# Patient Record
Sex: MALE | HISPANIC OR LATINO | Employment: FULL TIME | URBAN - METROPOLITAN AREA
[De-identification: names, ages, dates, MRNs, and addresses within clinical notes are randomized per-mention and may not be internally consistent; named-entity substitution may affect disease eponyms.]

---

## 2017-09-06 ENCOUNTER — HOSPITAL ENCOUNTER (OUTPATIENT)
Facility: MEDICAL CENTER | Age: 47
End: 2017-09-06
Attending: SURGERY | Admitting: SURGERY
Payer: COMMERCIAL

## 2017-09-06 VITALS
RESPIRATION RATE: 17 BRPM | TEMPERATURE: 97.6 F | BODY MASS INDEX: 27.34 KG/M2 | SYSTOLIC BLOOD PRESSURE: 102 MMHG | DIASTOLIC BLOOD PRESSURE: 66 MMHG | HEART RATE: 72 BPM | WEIGHT: 154.32 LBS | HEIGHT: 63 IN | OXYGEN SATURATION: 97 %

## 2017-09-06 PROBLEM — R22.9 LOCALIZED SUPERFICIAL SWELLING, MASS, OR LUMP: Status: ACTIVE | Noted: 2017-09-06

## 2017-09-06 PROCEDURE — 88304 TISSUE EXAM BY PATHOLOGIST: CPT

## 2017-09-06 PROCEDURE — 700101 HCHG RX REV CODE 250

## 2017-09-06 PROCEDURE — 160039 HCHG SURGERY MINUTES - EA ADDL 1 MIN LEVEL 3: Performed by: SURGERY

## 2017-09-06 PROCEDURE — A9270 NON-COVERED ITEM OR SERVICE: HCPCS

## 2017-09-06 PROCEDURE — 160009 HCHG ANES TIME/MIN: Performed by: SURGERY

## 2017-09-06 PROCEDURE — 700111 HCHG RX REV CODE 636 W/ 250 OVERRIDE (IP)

## 2017-09-06 PROCEDURE — 160035 HCHG PACU - 1ST 60 MINS PHASE I: Performed by: SURGERY

## 2017-09-06 PROCEDURE — 160036 HCHG PACU - EA ADDL 30 MINS PHASE I: Performed by: SURGERY

## 2017-09-06 PROCEDURE — 160048 HCHG OR STATISTICAL LEVEL 1-5: Performed by: SURGERY

## 2017-09-06 PROCEDURE — 160002 HCHG RECOVERY MINUTES (STAT): Performed by: SURGERY

## 2017-09-06 PROCEDURE — 700102 HCHG RX REV CODE 250 W/ 637 OVERRIDE(OP)

## 2017-09-06 PROCEDURE — 160028 HCHG SURGERY MINUTES - 1ST 30 MINS LEVEL 3: Performed by: SURGERY

## 2017-09-06 PROCEDURE — 501838 HCHG SUTURE GENERAL: Performed by: SURGERY

## 2017-09-06 RX ORDER — LIDOCAINE HYDROCHLORIDE 10 MG/ML
INJECTION, SOLUTION INFILTRATION; PERINEURAL
Status: COMPLETED
Start: 2017-09-06 | End: 2017-09-06

## 2017-09-06 RX ORDER — LIDOCAINE AND PRILOCAINE 25; 25 MG/G; MG/G
1 CREAM TOPICAL
Status: COMPLETED | OUTPATIENT
Start: 2017-09-06 | End: 2017-09-06

## 2017-09-06 RX ORDER — HYDROCODONE BITARTRATE AND ACETAMINOPHEN 5; 325 MG/1; MG/1
1-2 TABLET ORAL EVERY 6 HOURS PRN
Qty: 40 TAB | Refills: 0 | Status: SHIPPED | OUTPATIENT
Start: 2017-09-06 | End: 2018-09-24

## 2017-09-06 RX ORDER — SODIUM CHLORIDE, SODIUM LACTATE, POTASSIUM CHLORIDE, CALCIUM CHLORIDE 600; 310; 30; 20 MG/100ML; MG/100ML; MG/100ML; MG/100ML
INJECTION, SOLUTION INTRAVENOUS CONTINUOUS
Status: DISCONTINUED | OUTPATIENT
Start: 2017-09-06 | End: 2017-09-06 | Stop reason: HOSPADM

## 2017-09-06 RX ORDER — LIDOCAINE HYDROCHLORIDE 10 MG/ML
0.5 INJECTION, SOLUTION INFILTRATION; PERINEURAL
Status: COMPLETED | OUTPATIENT
Start: 2017-09-06 | End: 2017-09-06

## 2017-09-06 RX ORDER — OXYCODONE HCL 5 MG/5 ML
SOLUTION, ORAL ORAL
Status: COMPLETED
Start: 2017-09-06 | End: 2017-09-06

## 2017-09-06 RX ORDER — BUPIVACAINE HYDROCHLORIDE AND EPINEPHRINE 5; 5 MG/ML; UG/ML
INJECTION, SOLUTION PERINEURAL
Status: DISCONTINUED | OUTPATIENT
Start: 2017-09-06 | End: 2017-09-06 | Stop reason: HOSPADM

## 2017-09-06 RX ADMIN — LIDOCAINE HYDROCHLORIDE 0.5 ML: 10 INJECTION, SOLUTION INFILTRATION; PERINEURAL at 08:20

## 2017-09-06 RX ADMIN — OXYCODONE HYDROCHLORIDE 10 MG: 5 SOLUTION ORAL at 11:35

## 2017-09-06 RX ADMIN — SODIUM CHLORIDE, SODIUM LACTATE, POTASSIUM CHLORIDE, CALCIUM CHLORIDE 1000 ML: 600; 310; 30; 20 INJECTION, SOLUTION INTRAVENOUS at 08:20

## 2017-09-06 ASSESSMENT — PAIN SCALES - GENERAL
PAINLEVEL_OUTOF10: 2
PAINLEVEL_OUTOF10: 0
PAINLEVEL_OUTOF10: 0
PAINLEVEL_OUTOF10: 2
PAINLEVEL_OUTOF10: 0
PAINLEVEL_OUTOF10: 2
PAINLEVEL_OUTOF10: 0

## 2017-09-06 NOTE — PROGRESS NOTES
D/C instructions:    1. DIET: Upon discharge from the hospital you may resume your normal preoperative diet. Depending on how you are feeling and whether you have nausea or not, you may wish to stay with a bland diet for the first few days. However, you can advance this as quickly as you feel ready.    2. ACTIVITIES: After discharge from the hospital, you may resume full routine activities. However, there should be no heavy lifting (greater than 15 pounds) and no strenuous activities until after your follow-up visit. Otherwise, routine activities of daily living are acceptable.    3. DRIVING: You may drive whenever you are off pain medications and are able to perform the activities needed to drive, i.e. turning, bending, twisting, etc.    4. BATHING: You may get the wound wet at any time after leaving the hospital. You may shower, but do not submerge in a bath for at least a week. Dressings may come off after 48 hours.    5. BOWEL FUNCTION: Constipation is common after an operation, especially with pain medications. The combination of pain medication and decreased activity level can cause constipation in otherwise normal patients. If you feel this is occurring, take a laxative (Milk of Magnesia, Ex-Lax, Senokot, etc.) until the problem has resolved.    6. PAIN MEDICATION: You will be given a prescription for pain medication at discharge. Please take these as directed. It is important to remember not to take medications on an empty stomach as this may cause nausea.    7.CALL IF YOU HAVE: (1) Fevers to more than 1010 F, (2) Unusual chest or leg pain, (3) Drainage or fluid from incision that may be foul smelling, increased tenderness or soreness at the wound or the wound edges are no longer together, redness or swelling at the incision site. Please do not hesitate to call with any other questions.     8. APPOINTMENT: Contact our office at [phone] for a follow-up appointment in 1 weeks following your  procedure.    If you have any additional questions, please do not hesitate to call the office and speak to either myself or the physician on call.    Office address:  [address] [place], [place] [address] [address]      Lyle Lucia MD  [facility]  [phone]

## 2017-09-06 NOTE — OP REPORT
DATE OF SERVICE:  09/06/2017    PREOPERATIVE DIAGNOSIS:  Intermuscular right upper back mass.    POSTOPERATIVE DIAGNOSIS:  Intermuscular right upper back mass.    PROCEDURE:  Excision of deep intramuscular mass of the right upper back.    SURGEON:  Lyle Lucia MD    ASSISTANT:  HOSEA Eckert    ANESTHESIOLOGIST:  Jose Almanzar MD    ANESTHESIA:  General endotracheal.    ESTIMATED BLOOD LOSS:  20 mL    SPECIMENS:  Right upper back mass.    INDICATIONS:  This patient is a very pleasant 47-year-old male who presented   with the groin mass in his right upper back.  Preoperative imaging suggestive   that this was intramuscular.  It was likely consistent with a large lipoma   versus possible liposarcoma.  He desired excision of this and I agreed.  We   discussed risks including bleeding, infection, recurrence, need for further   operation.  He understood these risks and is willing to proceed.    FINDINGS:  There was a fatty mass within the musculature superficial to the   right scapula measuring approximately 10 x 8 x 6 cm.    PROCEDURE IN DETAIL:  Informed consent was obtained.  The patient was brought   back to the operating room and general anesthesia was induced.  He was then   repositioned in a prone placement.  Pre-incision time-out procedure was   performed and pre-incision antibiotics were given.  After the area was   thoroughly prepped and draped, the  skin incision was made over the area.  We   dissected down through the subcutaneous tissues and in the deep fascia over   the muscle.  Muscle fibers were divided to encounter the mass.  The mass shelled out   easily from the surrounding structures was excised using cautery.  The   specimen was oriented and passed off.  The area was thoroughly irrigated and   hemostasis was achieved using electrocautery.  We then reapproximated the   fascia over the muscle using interrupted 3-0 Vicryl sutures.  The deep dermal   layer was then closed with  interrupted 3-0 Vicryl sutures and the skin wounds   were closed with running subcuticular 4-0 Vicryl.  The wound was then dressed   with Steri-Strips, gauze and Tegaderm.  Patient tolerated the procedure well   and was taken to recovery in stable condition.  All sponge and instrument   counts were correct.       ____________________________________     MD LEYLA Sher / ANH    DD:  09/06/2017 10:59:24  DT:  09/06/2017 11:27:38    D#:  [id number]  Job#:  [id number]

## 2017-09-06 NOTE — DISCHARGE INSTRUCTIONS
ACTIVITY: Rest and take it easy for the first 24 hours.  A responsible adult is recommended to remain with you during that time.  It is normal to feel sleepy.  We encourage you to not do anything that requires balance, judgment or coordination.    MILD FLU-LIKE SYMPTOMS ARE NORMAL. YOU MAY EXPERIENCE GENERALIZED MUSCLE ACHES, THROAT IRRITATION, HEADACHE AND/OR SOME NAUSEA.    FOR 24 HOURS DO NOT:  Drive, operate machinery or run household appliances.  Drink beer or alcoholic beverages.   Make important decisions or sign legal documents.    SPECIAL INSTRUCTIONS: *PLEASE SEE ATTACHED INSTRUCTION SHEET**    DIET: To avoid nausea, slowly advance diet as tolerated, avoiding spicy or greasy foods for the first day.  Add more substantial food to your diet according to your physician's instructions.  Babies can be fed formula or breast milk as soon as they are hungry.  INCREASE FLUIDS AND FIBER TO AVOID CONSTIPATION.    SURGICAL DRESSING/BATHING: *OK TO SHOWER.  MAY REMOVED DRESSING IN 48 HOURS.  NO TUB BATHS, HOT TUBS OR SOAKING.**    FOLLOW-UP APPOINTMENT:  A follow-up appointment should be arranged with your doctor; call to schedule.    You should CALL YOUR PHYSICIAN if you develop:  Fever greater than 101 degrees F.  Pain not relieved by medication, or persistent nausea or vomiting.  Excessive bleeding (blood soaking through dressing) or unexpected drainage from the wound.  Extreme redness or swelling around the incision site, drainage of pus or foul smelling drainage.  Inability to urinate or empty your bladder within 8 hours.  Problems with breathing or chest pain.    You should call 911 if you develop problems with breathing or chest pain.  If you are unable to contact your doctor or surgical center, you should go to the nearest emergency room or urgent care center.    Physician's telephone #: *[phone]**    If any questions arise, call your doctor.  If your doctor is not available, please feel free to call the  Surgical Center at [phone].  The Center is open Monday through Friday from 7AM to 7PM.  You can also call the HEALTH HOTLINE open 24 hours/day, 7 days/week and speak to a nurse at [phone], or toll free at [phone].    A registered nurse may call you a few days after your surgery to see how you are doing after your procedure.    MEDICATIONS: Resume taking daily medication.  Take prescribed pain medication with food.  If no medication is prescribed, you may take non-aspirin pain medication if needed.  PAIN MEDICATION CAN BE VERY CONSTIPATING.  Take a stool softener or laxative such as senokot, pericolace, or milk of magnesia if needed.    Prescription given for *NORCO**.  Last pain medication given at **11:30*.    If your physician has prescribed pain medication that includes Acetaminophen (Tylenol), do not take additional Acetaminophen (Tylenol) while taking the prescribed medication.    Depression / Suicide Risk    As you are discharged from this [facility] facility, it is important to learn how to keep safe from harming yourself.    Recognize the warning signs:  · Abrupt changes in personality, positive or negative- including increase in energy   · Giving away possessions  · Change in eating patterns- significant weight changes-  positive or negative  · Change in sleeping patterns- unable to sleep or sleeping all the time   · Unwillingness or inability to communicate  · Depression  · Unusual sadness, discouragement and loneliness  · Talk of wanting to die  · Neglect of personal appearance   · Rebelliousness- reckless behavior  · Withdrawal from people/activities they love  · Confusion- inability to concentrate     If you or a loved one observes any of these behaviors or has concerns about self-harm, here's what you can do:  · Talk about it- your feelings and reasons for harming yourself  · Remove any means that you might use to hurt yourself (examples: pills, rope, extension cords, firearm)  · Get  professional help from the community (Mental Health, Substance Abuse, psychological counseling)  · Do not be alone:Call your Safe Contact- someone whom you trust who will be there for you.  · Call your local CRISIS HOTLINE 457-7760 or 448-802-7320  · Call your local Children's Mobile Crisis Response Team [place] (471) 447-3923 or www.[employer]  · Call the toll free National Suicide Prevention Hotlines   · National Suicide Prevention Lifeline 411-162-MERQ (0936)  · National Hope Line Network 800-SUICIDE (537-1618)

## 2017-09-06 NOTE — OR NURSING
RECEIVED FROM OR WITH DR KOWALSKI.  ORAL AIRWAY IN PLACE.  VSS.    1117 AIRWAY DC'D WITHOUT PROBLEM.  SLEEPY.  GAUZE AND TEGADERM DRESSING ON RIGHT MID BACK DRY AND IN TACT.  1135 STATES HIS BACK HURTS A LITTLE.  GIVEN ORAL PAIN MEDICATION PER ORDER. POSITIONED ON LEFT SIDE FOR COMFORT.  1150  WIFE BROUGHT TO BEDSIDE.    1230 PAIN RESOLVED.  EATING POPSICLES  1305 UP AND DRESSED AND IN RECLINER.  DISCHARGE INSTRUCTIONS TO PATIENT AND WIFE.    1350  PATIENT FEELS READY FOR DISCHARGE.  NO CHANGE AT SITE. ALL QUESTIONS ANSWERED.

## 2017-09-06 NOTE — OR SURGEON
Operative Report    PreOp Diagnosis: back mass    PostOp Diagnosis: same    Procedure(s):  MASS EXCISION GENERAL - SUBCUTANEOUS, SCAPULA - Wound Class: Clean    Surgeon(s):  Lyle Lucia M.D.    Anesthesiologist/Type of Anesthesia:  Anesthesiologist: Jose Almanzar M.D./General    Surgical Staff:  Assistant: Lesvia Prabhakar  Circulator: Shahnaz Ortega R.N.  Scrub Person: Taya Virgen R.N.    Specimens:  * No specimens in log *    Estimated Blood Loss: 20 cc    Findings: intramuscular lipomatous mass over right scapula    Complications: none        9/6/2017 10:55 AM Lyle Lucia

## 2018-07-30 ENCOUNTER — OCCUPATIONAL MEDICINE (OUTPATIENT)
Dept: URGENT CARE | Facility: CLINIC | Age: 48
End: 2018-07-30
Payer: COMMERCIAL

## 2018-07-30 ENCOUNTER — APPOINTMENT (OUTPATIENT)
Dept: RADIOLOGY | Facility: IMAGING CENTER | Age: 48
End: 2018-07-30
Attending: PHYSICIAN ASSISTANT
Payer: COMMERCIAL

## 2018-07-30 VITALS
SYSTOLIC BLOOD PRESSURE: 112 MMHG | RESPIRATION RATE: 16 BRPM | OXYGEN SATURATION: 100 % | HEIGHT: 63 IN | TEMPERATURE: 97.5 F | HEART RATE: 56 BPM | WEIGHT: 154 LBS | BODY MASS INDEX: 27.29 KG/M2 | DIASTOLIC BLOOD PRESSURE: 76 MMHG

## 2018-07-30 DIAGNOSIS — M62.838 MUSCLE SPASM: ICD-10-CM

## 2018-07-30 DIAGNOSIS — S39.012A STRAIN OF LUMBAR REGION, INITIAL ENCOUNTER: ICD-10-CM

## 2018-07-30 DIAGNOSIS — M54.5 ACUTE MIDLINE LOW BACK PAIN, WITH SCIATICA PRESENCE UNSPECIFIED: ICD-10-CM

## 2018-07-30 LAB
AMP AMPHETAMINE: NORMAL
BAR BARBITURATES: NORMAL
BREATH ALCOHOL COMMENT: NORMAL
BZO BENZODIAZEPINES: NORMAL
COC COCAINE: NORMAL
INT CON NEG: NORMAL
INT CON POS: NORMAL
MDMA ECSTASY: NORMAL
MET METHAMPHETAMINES: NORMAL
MTD METHADONE: NORMAL
OPI OPIATES: NORMAL
OXY OXYCODONE: NORMAL
PCP PHENCYCLIDINE: NORMAL
POC BREATHALIZER: 0 PERCENT (ref 0–0.01)
POC URINE DRUG SCREEN OCDRS: NEGATIVE
THC: NORMAL

## 2018-07-30 PROCEDURE — 80305 DRUG TEST PRSMV DIR OPT OBS: CPT | Performed by: PHYSICIAN ASSISTANT

## 2018-07-30 PROCEDURE — 82075 ASSAY OF BREATH ETHANOL: CPT | Performed by: PHYSICIAN ASSISTANT

## 2018-07-30 PROCEDURE — 99203 OFFICE O/P NEW LOW 30 MIN: CPT | Mod: 29 | Performed by: PHYSICIAN ASSISTANT

## 2018-07-30 PROCEDURE — 72100 X-RAY EXAM L-S SPINE 2/3 VWS: CPT | Mod: TC,29 | Performed by: PHYSICIAN ASSISTANT

## 2018-07-30 RX ORDER — CYCLOBENZAPRINE HCL 5 MG
5-10 TABLET ORAL 3 TIMES DAILY PRN
Qty: 30 TAB | Refills: 0 | Status: SHIPPED | OUTPATIENT
Start: 2018-07-30 | End: 2018-08-23

## 2018-07-30 NOTE — LETTER
"EMPLOYEE’S CLAIM FOR COMPENSATION/ REPORT OF INITIAL TREATMENT  FORM C-4    EMPLOYEE’S CLAIM - PROVIDE ALL INFORMATION REQUESTED   First Name  John Last Name  Josh Moran Birthdate                    1970                Sex  male Claim Number   Home Address  [address] Age  48 y.o. Height  1.6 m (5' 3\") Weight  69.9 kg (154 lb) [facility]     [facility] [place]  [address] Telephone  [phone] (home)    Mailing Address  [address] [facility]  [address] Primary Language Spoken  English    Insurer  Unknown Third Party       Employee's Occupation (Job Title) When Injury or Occupational Disease Occurred      Employer's Name  [employer]  Telephone  [phone]    Employer Address  [address] [facility]  [address]    Date of Injury  7/26/2018               Hour of Injury  11:22 AM Date Employer Notified  7/26/2018 Last Day of Work after Injury or Occupational Disease  7/26/2018 Supervisor to Whom Injury Reported  Ayse   Address or Location of Accident (if applicable)  [[address] [facility] [address]]   What were you doing at the time of accident? (if applicable)  Load /unload of suitcases    How did this injury or occupational disease occur? (Be specific an answer in detail. Use additional sheet if necessary)  Had lifted a 40lbs suitcase and was going to load it onto the shuttle when I felt a sharp pain in my lower back hip and leg   If you believe that you have an occupational disease, when did you first have knowledge of the disability and it relationship to your employment?  n/a Witnesses to the Accident  no      Nature of Injury or Occupational Disease  Strain  Part(s) of Body Injured or Affected  Lower Back Area (Lumbar Area & Lumbo-Sacral), Hip (L), Lower Leg (L)    I certify that the above is true and correct to the best of my knowledge and that I have " provided this information in order to obtain the benefits of [place]  I hereby authorize any physician, chiropractor, surgeon, practitioner, or other person, any hospital, including [facility] hospital, any medical service organization, any insurance company, or other institution or organization to release to each other, any medical or other information, including benefits paid or payable, pertinent to this injury or disease, except information relative to diagnosis, treatment and/or counseling for AIDS, psychological conditions, alcohol or controlled substances, for which I must give specific authorization.  A Photostat of this authorization shall be as valid as the original.     Date   Place   Employee’s Signature   THIS REPORT MUST BE COMPLETED AND MAILED WITHIN 3 WORKING DAYS OF TREATMENT   Place  [facility]  Name of Facility  [facility]   Date  7/30/2018 Diagnosis  (S39.012A) Strain of lumbar region, initial encounter  (M62.838) Muscle spasm Is there evidence the injured employee was under the influence of alcohol and/or another controlled substance at the time of accident?   Hour  4:37 PM Description of Injury or Disease  Diagnoses of Strain of lumbar region, initial encounter and Muscle spasm were pertinent to this visit.     Treatment  Patient is to utilize ibuprofen 600 mg 3 times daily as needed with food, Flexeril was also written for this patient today as well.  Patient is to continue to use heat rotation.  Recheck in 3 days.  Have you advised the patient to remain off work five days or more? No   X-Ray Findings  Negative  Comments:Negative lumbar x-ray   If Yes   From Date  To Date      From information given by the employee, together with medical evidence, can you directly connect this injury or occupational disease as job incurred?    Comments:?  Difficult to  "correlate with exact mechanism, injury or fall. If No Full Duty  No Modified Duty  Yes   Is additional medical care by a physician indicated?  Yes If Modified Duty, Specify any Limitations / Restrictions      Do you know of any previous injury or disease contributing to this condition or occupational disease?                            No   Date  7/30/2018 Print Doctor’s Name Lorenzo Lemos P.A.-C. I certify the employer’s copy of  this form was mailed on:   Address  [address] [place] [address] [address]’s Use Only     [facility]  [address]    Provider’s Tax ID Number  [id number] Telephone  Dept: [phone]        e-LORENZO Villagomez P.A.-C.   e-Signature: Dr. Carson David, Medical Director Degree  MARLEEN        ORIGINAL-TREATING PHYSICIAN OR CHIROPRACTOR    PAGE 2-INSURER/TPA    PAGE 3-EMPLOYER    PAGE 4-EMPLOYEE             Form C-4 (rev10/07)              BRIEF DESCRIPTION OF RIGHTS AND BENEFITS  (Pursuant to [place]RS 616C.050)    Notice of Injury or Occupational Disease (Incident Report Form C-1): If an injury or occupational disease (OD) arises out of and in the  course of employment, you must provide written notice to your employer as soon as practicable, but no later than 7 days after the accident or  OD. Your employer shall maintain a sufficient supply of the required forms.    Claim for Compensation (Form C-4): If medical treatment is sought, the form C-4 is available at the place of initial treatment. A completed  \"Claim for Compensation\" (Form C-4) must be filed within 90 days after an accident or OD. The treating physician or chiropractor must,  within 3 working days after treatment, complete and mail to the employer, the employer's insurer and third-party , the Claim for  Compensation.    Medical Treatment: If you require medical treatment for your on-the-job injury or OD, you may be required to select a physician or  chiropractor from a list provided by your workers’ " compensation insurer, if it has contracted with an Organization for Managed Care (MCO) or  Preferred Provider Organization (PPO) or providers of health care. If your employer has not entered into a contract with an MCO or PPO, you  may select a physician or chiropractor from the Panel of Physicians and Chiropractors. Any medical costs related to your industrial injury or  OD will be paid by your insurer.    Temporary Total Disability (TTD): If your doctor has certified that you are unable to work for a period of at least 5 consecutive days, or 5  cumulative days in a 20-day period, or places restrictions on you that your employer does not accommodate, you may be entitled to TTD  compensation.    Temporary Partial Disability (TPD): If the wage you receive upon reemployment is less than the compensation for TTD to which you are  entitled, the insurer may be required to pay you TPD compensation to make up the difference. TPD can only be paid for a maximum of 24  months.    Permanent Partial Disability (PPD): When your medical condition is stable and there is an indication of a PPD as a result of your injury or  OD, within 30 days, your insurer must arrange for an evaluation by a rating physician or chiropractor to determine the degree of your PPD. The  amount of your PPD award depends on the date of injury, the results of the PPD evaluation and your age and wage.    Permanent Total Disability (PTD): If you are medically certified by a treating physician or chiropractor as permanently and totally disabled  and have been granted a PTD status by your insurer, you are entitled to receive monthly benefits not to exceed 66 2/3% of your average  monthly wage. The amount of your PTD payments is subject to reduction if you previously received a PPD award.    Vocational Rehabilitation Services: You may be eligible for vocational rehabilitation services if you are unable to return to the job due to a  permanent physical  impairment or permanent restrictions as a result of your injury or occupational disease.    Transportation and Per Johnny Reimbursement: You may be eligible for travel expenses and per johnny associated with medical treatment.    Reopening: You may be able to reopen your claim if your condition worsens after claim closure.    Appeal Process: If you disagree with a written determination issued by the insurer or the insurer does not respond to your request, you may  appeal to the Department of Administration, , by following the instructions contained in your determination letter. You must  appeal the determination within 70 days from the date of the determination letter at [address], [address], [place], [place]  [address], or [address], [address], [place] [address]. If you disagree with the  decision, you may appeal to the  Department of Administration, . You must file your appeal within 30 days from the date of the  decision  letter at [address], [address], [place], [place] [address], or [address], [address], [place] [address]. If you  disagree with a decision of an , you may file a petition for judicial review with the District Court. You must do so within 30  days of the Appeal Officer’s decision. You may be represented by an  at your own expense or you may contact the [facility] for possible  representation.    [place]  for Injured Workers (NAIW): If you disagree with a  decision, you may request that NAIW represent you  without charge at an  Hearing. For information regarding denial of benefits, you may contact the [facility] at: [address], [address], [place] [address], (501) [phone], or [address], [address], [place] [address], ([phone]) [phone]    To File a Complaint with the Division: If you wish to file a complaint with  the  of the Division of Industrial Relations (DIR),  please contact the Workers’ Compensation Section, [address], [address], [place], [place] [address], telephone (801) 326-9657, or  [address], [address], [place], [place] [address], telephone [phone]) [phone].    For assistance with Workers’ Compensation Issues: you may contact the Office of the Governor Consumer Health Assistance, [address] [place] [address], [address], [place], [place] [address], Toll Free 1-441.929.4344, Web site: http://[email or web address].[facility].[email or web address]., E-mail  [email or web address].                                                                                                                                                                                                                                   __________________________________________________________________                                                                   _________________                Employee Name / Signature                                                                                                                                                       Date                                                                                                                                                                                                     D-2 (rev. 10/07)

## 2018-07-30 NOTE — LETTER
[place] [address] [facility] [address] [place] [address]  Phone:  [phone] - Fax:  [phone]   [facility] Progress Report and Disability Certification  Date of Service: 7/30/2018   No Show:  No  Date / Time of Next Visit: 8/2/2018@11:45am   Claim Information   Patient Name: John Moran  Claim Number:     Employer: [employer]  Date of Injury: 7/26/2018     Insurer / TPA:   [place] ID / SSN:     Occupation:   Diagnosis: Diagnoses of Strain of lumbar region, initial encounter and Muscle spasm were pertinent to this visit.    Medical Information   Related to Industrial Injury?   Comments:? difficult to correlate to specific injury .    Subjective Complaints:  DOI: 7/26/18-patient reports developing a low back pain with radiation to the front of his left leg toward the end of his shift on the 26th.  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  He reports that he can not time the back pain to a specific fall, injury or slippage of a luggage back and reports worsening pain toward the end of his shift.  Patient reports that since then he has been using aspirin, IcyHot and ice and heat without improvement of his symptoms.  Patient does report that his pain is 8 out of 10 at this time.  Patient denies prior history of back pain and or sciatica. Denies any leg weakness or paresthesias, incontinence, saddle anesthesias.   Patient denies a second job.   Objective Findings: Spine- without step-off or deformity. Without noted tenderness over the sacroiliac notches. Midline lumbar tenderness over L4-5-  without scoliosis or kyphosis. Left lumbar paraspinous spasm- FROM with lateral bend, and flexion/extension. Sensation intact bilaterally, BLE motor 5/5 and symmetrical  strength. Negative Straight leg raise.  Gait- WNL slow.       Pre-Existing Condition(s): None  known.    Assessment:   Initial Visit    Status: Additional Care Required  Permanent Disability:No    Plan:      Diagnostics: X-ray  Comments:Negative lumbar x-ray    Comments:       Disability Information   Status: Released to Restricted Duty    From:  2018  Through: 2018 Restrictions are: Temporary   Physical Restrictions   Sitting:    Standing:    Stooping:    Bending:      Squatting:    Walking:    Climbin hrs/day Pushing:      Pulling:    Other:    Reaching Above Shoulder (L):   Reaching Above Shoulder (R):       Reaching Below Shoulder (L):    Reaching Below Shoulder (R):      Not to exceed Weight Limits   Carrying(hrs):   Weight Limit(lb): < or = to 10 pounds Lifting(hrs):   Weight  Limit(lb): < or = to 10 pounds   Comments: Patient is to utilize ibuprofen 600 mg up to 3 times daily with meals.  Flexeril was also written today 5-10 mg up to 3 times daily as needed.  Patient is to return to clinic in 3 days for recheck or sooner if pain worsens.  Patient also is to continue with heat rotation    Repetitive Actions   Hands: i.e. Fine Manipulations from Grasping:     Feet: i.e. Operating Foot Controls:     Driving / Operate Machinery:     Physician Name: Lorenzo Lemos P.A.-C. Physician Signature: LORENZO Mcgregor P.A.-C. e-Signature: Dr. Carson David, Medical Director   Clinic Name / Location: [address] [address] Clinic Phone Number: Dept: [phone]   Appointment Time: 3:15 Pm Visit Start Time: 4:37 PM   Check-In Time:  3:24 Pm Visit Discharge Time: 5:52 Pm    Original-Treating Physician or Chiropractor    Page 2-Insurer/TPA    Page 3-Employer    Page 4-Employee

## 2018-07-31 ASSESSMENT — ENCOUNTER SYMPTOMS
WHEEZING: 0
FEVER: 0
VISUAL CHANGE: 0
EYE REDNESS: 0
NAUSEA: 0
VOMITING: 0
COUGH: 0
ABDOMINAL PAIN: 0
DIARRHEA: 0
SORE THROAT: 0
NECK PAIN: 0
EYE DISCHARGE: 0
FALLS: 0
BACK PAIN: 1
SENSORY CHANGE: 0
CHILLS: 0
JOINT SWELLING: 0
TINGLING: 0
HEADACHES: 0

## 2018-07-31 NOTE — PROGRESS NOTES
Subjective:      John Moran is a 48 y.o. male who presents with Other (unloading luggage pain from back to kne, causing hard to move )      DOI: 7/26/18-patient reports developing a low back pain with radiation to the front of his left leg toward the end of his shift on the 26th.  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  He reports that he can not time the back pain to a specific fall, injury or slippage of a luggage back and reports worsening pain toward the end of his shift.  Patient reports that since then he has been using aspirin, IcyHot and ice and heat without improvement of his symptoms.  Patient does report that his pain is 8 out of 10 at this time.  Patient denies prior history of back pain and or sciatica. Denies any leg weakness or paresthesias, incontinence, saddle anesthesias.   Patient denies a second job.     Other   This is a new problem. The current episode started in the past 7 days. The problem occurs constantly. The problem has been waxing and waning. Pertinent negatives include no abdominal pain, chills, congestion, coughing, fever, headaches, joint swelling, nausea, neck pain, rash, sore throat, visual change or vomiting. Exacerbated by: Bending. Treatments tried: ASA, ice, and heat.       Review of Systems   Constitutional: Negative for chills, fever and malaise/fatigue.   HENT: Negative for congestion, ear discharge, ear pain and sore throat.    Eyes: Negative for discharge and redness.   Respiratory: Negative for cough and wheezing.    Gastrointestinal: Negative for abdominal pain, diarrhea, nausea and vomiting.   Genitourinary: Negative for dysuria and urgency.        Denies any new urinary or bowel incontinence   Musculoskeletal: Positive for back pain. Negative for falls, joint swelling and neck pain.        Specifically without leg weakness   Skin: Negative for itching and rash.  "  Neurological: Negative for tingling, sensory change and headaches.          Objective:     /76   Pulse (!) 56   Temp 36.4 °C (97.5 °F)   Resp 16   Ht 1.6 m (5' 3\")   Wt 69.9 kg (154 lb)   SpO2 100%   BMI 27.28 kg/m²    PMH:  has a past medical history of Subcutaneous mass.  MEDS:   Current Outpatient Prescriptions:   •  cyclobenzaprine (FLEXERIL) 5 MG tablet, Take 1-2 Tabs by mouth 3 times a day as needed (Avoid while driving.)., Disp: 30 Tab, Rfl: 0  •  hydrocodone-acetaminophen (NORCO) 5-325 MG Tab per tablet, Take 1-2 Tabs by mouth every 6 hours as needed., Disp: 40 Tab, Rfl: 0  ALLERGIES: No Known Allergies  SURGHX:   Past Surgical History:   Procedure Laterality Date   • MASS EXCISION GENERAL Right 9/6/2017    Procedure: MASS EXCISION GENERAL - SUBCUTANEOUS, SCAPULA;  Surgeon: Lyle Lucia M.D.;  Location: SURGERY SAME DAY [facility];  Service: General     SOCHX:  reports that he has never smoked. He has never used smokeless tobacco. He reports that he does not drink alcohol or use drugs.  FH: Family history was reviewed, no pertinent findings to report    Physical Exam   Constitutional: He is oriented to person, place, and time. He appears well-developed and well-nourished. No distress.   HENT:   Head: Normocephalic and atraumatic.   Eyes: Pupils are equal, round, and reactive to light. Conjunctivae and EOM are normal.   Neck: Normal range of motion. Neck supple. No tracheal deviation present.   Cardiovascular: Normal rate.    Pulmonary/Chest: Effort normal.   Neurological: He is alert and oriented to person, place, and time. Coordination normal.   Skin: Skin is warm. No rash noted.   Psychiatric: He has a normal mood and affect. His behavior is normal. Judgment and thought content normal.   Vitals reviewed.      Spine- without step-off or deformity. Without noted tenderness over the sacroiliac notches. Midline lumbar tenderness over L4-5-  without scoliosis or kyphosis. Left lumbar " paraspinous spasm- FROM with lateral bend, and flexion/extension. Sensation intact bilaterally, BLE motor 5/5 and symmetrical  strength. Negative Straight leg raise.  Gait- WNL slow.         XR lumbar:  The alignment of the lumbar spine is normal.  The vertebral body heights are maintained.    The disc spaces are maintained.  There is no significant facet arthropathy.  There is no acute abnormality.  The SI joints appear normal.    Assessment/Plan:     1. Strain of lumbar region, initial encounter  2. Muscle spasm  - cyclobenzaprine (FLEXERIL) 5 MG tablet; Take 1-2 Tabs by mouth 3 times a day as needed (Avoid while driving.).  Dispense: 30 Tab; Refill: 0    Patient is to began utilization of ibuprofen 600 mg 3 times daily as needed with meals.  Please see D 39 for further restriction information.  Patient is to follow-up in 3 days for recheck.

## 2018-08-02 ENCOUNTER — OCCUPATIONAL MEDICINE (OUTPATIENT)
Dept: OCCUPATIONAL MEDICINE | Facility: CLINIC | Age: 48
End: 2018-08-02
Payer: COMMERCIAL

## 2018-08-02 VITALS
HEIGHT: 63 IN | WEIGHT: 145 LBS | OXYGEN SATURATION: 99 % | SYSTOLIC BLOOD PRESSURE: 124 MMHG | DIASTOLIC BLOOD PRESSURE: 76 MMHG | TEMPERATURE: 97 F | BODY MASS INDEX: 25.69 KG/M2 | HEART RATE: 72 BPM

## 2018-08-02 DIAGNOSIS — M54.16 LEFT LUMBAR RADICULOPATHY: ICD-10-CM

## 2018-08-02 PROCEDURE — 99204 OFFICE O/P NEW MOD 45 MIN: CPT | Performed by: PREVENTIVE MEDICINE

## 2018-08-02 RX ORDER — PREDNISONE 20 MG/1
20 TABLET ORAL 2 TIMES DAILY
Qty: 14 TAB | Refills: 0 | Status: SHIPPED | OUTPATIENT
Start: 2018-08-02 | End: 2018-09-24

## 2018-08-02 NOTE — PROGRESS NOTES
"Subjective:      John Moran is a 48 y.o. male who presents with Back Pain (WC DOI 7/26/18 left leg and back injury-better RM 24)      Date of injury 7/26/2018.  Mechanism of injury- \"lifted a 40 pound suitcase and was going to load it onto the shoulder when I felt a sharp pain in my lower back, hip, and leg.  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  He was seen in urgent care 1 week ago where x-rays were negative.  Today, he does indicate some improvement.  He seems to indicate he is about 50% better.  He continues to have left-sided lumbar back pain with radiation to the knee.  No urinary symptoms.     HPI    ROS  Comprehensive medical history form reviewed. Pertinent positives and negatives included in HPI.    PFSH: reviewed in Epic    PMH:  has a past medical history of Subcutaneous mass.  MEDS:   Current Outpatient Prescriptions:   •  IBUPROFEN PO, Take  by mouth., Disp: , Rfl:   •  predniSONE (DELTASONE) 20 MG Tab, Take 1 Tab by mouth 2 times a day., Disp: 14 Tab, Rfl: 0  •  cyclobenzaprine (FLEXERIL) 5 MG tablet, Take 1-2 Tabs by mouth 3 times a day as needed (Avoid while driving.)., Disp: 30 Tab, Rfl: 0  •  hydrocodone-acetaminophen (NORCO) 5-325 MG Tab per tablet, Take 1-2 Tabs by mouth every 6 hours as needed. (Patient not taking: Reported on 8/2/2018), Disp: 40 Tab, Rfl: 0  ALLERGIES: No Known Allergies  SURGHX:   Past Surgical History:   Procedure Laterality Date   • MASS EXCISION GENERAL Right 9/6/2017    Procedure: MASS EXCISION GENERAL - SUBCUTANEOUS, SCAPULA;  Surgeon: Lyle Lucia M.D.;  Location: SURGERY SAME DAY [facility];  Service: General     SOCHX:  reports that he has never smoked. He has never used smokeless tobacco. He reports that he does not drink alcohol or use drugs.  Work Status:  with [employer]  FH: No pertinent hereditary disorders.        Objective:     /76   Pulse 72   Temp 36.1 °C (97 °F)   Ht 1.6 " "m (5' 3\")   Wt 65.8 kg (145 lb)   SpO2 99%   BMI 25.69 kg/m²      Physical Exam    Appearance: Well-developed, well-nourished.  Work attire.  Mental Status: Mood and Affect normal. Pleasant. Cooperative. Appropriate.   ENT: Oropharynx clear. Moist mucous membranes. Hearing normal.   Eyes: Pupils reactive. Conjunctiva normal. No scleral icterus.   Neck: Trachea Midline. No thyromegaly. No masses.  Cardiovascular: Normal rate. Regular rhythm. Normal heart sounds.   Chest: Effort normal. Breath sounds clear.   Skin: Skin is warm and dry. No rash.   Musculoskeletal: Back exam shows tenderness in the left lumbosacral area and sciatic notch.  Positive straight leg raise on the left.  Pain with flexion greater than extension.  Pain with left sidebending.  Distal neurovascular intact.  Lumbar spine x-rays final interpretation negative.         Assessment/Plan:     1. Left lumbar radiculopathy  New to occupational health from urgent care  - predniSONE (DELTASONE) 20 MG Tab; Take 1 Tab by mouth 2 times a day.  Dispense: 14 Tab; Refill: 0  - REFERRAL TO PHYSICAL THERAPY Reason for Therapy: Eval/Treat/Report  Advance work restrictions  Recheck in 2-3 weeks when if not improved will pursue MRI of lumbar spine to exclude discogenic low back pain  His steroids and physical therapy are not helpful, anticipate physiatry consultation for epidural steroids      "

## 2018-08-02 NOTE — LETTER
"[address],   [address] [place] [address]  Phone:  [phone] - Fax:  [phone]   Occupational Health [facility] Progress Report and Disability Certification  Date of Service: 8/2/2018   No Show:  No  Date / Time of Next Visit: 8/23/2018@3:45 PM   Claim Information   Patient Name: John Moran  Claim Number:     Employer: [employer]  Date of Injury: 7/26/2018     Insurer / TPA: [employer]  ID / SSN:     Occupation:   Diagnosis: The encounter diagnosis was Left lumbar radiculopathy.    Medical Information   Related to Industrial Injury? Yes    Subjective Complaints:  Date of injury 7/26/2018.  Mechanism of injury- \"lifted a 40 pound suitcase and was going to load it onto the shoulder when I felt a sharp pain in my lower back, hip, and leg.  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  He was seen in urgent care 1 week ago where x-rays were negative.  Today, he does indicate some improvement.  He seems to indicate he is about 50% better.  He continues to have left-sided lumbar back pain with radiation to the knee.  No urinary symptoms.   Objective Findings: Appearance: Well-developed, well-nourished.  Work attire.  Mental Status: Mood and Affect normal. Pleasant. Cooperative. Appropriate.   ENT: Oropharynx clear. Moist mucous membranes. Hearing normal.   Eyes: Pupils reactive. Conjunctiva normal. No scleral icterus.   Neck: Trachea Midline. No thyromegaly. No masses.  Cardiovascular: Normal rate. Regular rhythm. Normal heart sounds.   Chest: Effort normal. Breath sounds clear.   Skin: Skin is warm and dry. No rash.   Musculoskeletal: Back exam shows tenderness in the left lumbosacral area and sciatic notch.  Positive straight leg raise on the left.  Pain with flexion greater than extension.  Pain with left sidebending.  Distal neurovascular intact.  Lumbar spine x-rays final " interpretation negative.     Pre-Existing Condition(s):     Assessment:   Condition Improved    Status: Additional Care Required  Permanent Disability:No    Plan: MedicationPT    Diagnostics:      Comments:       Disability Information   Status: Released to Restricted Duty    From:  8/2/2018  Through: 8/23/2018 Restrictions are: Temporary   Physical Restrictions   Sitting:    Standing:    Stooping:    Bending:  < or = to 1 hr/day   Squatting:    Walking:    Climbing:    Pushing:      Pulling:    Other:    Reaching Above Shoulder (L):   Reaching Above Shoulder (R):       Reaching Below Shoulder (L):    Reaching Below Shoulder (R):      Not to exceed Weight Limits   Carrying(hrs):   Weight Limit(lb):   Lifting(hrs):   Weight  Limit(lb): < or = to 25 pounds   Comments:      Repetitive Actions   Hands: i.e. Fine Manipulations from Grasping:     Feet: i.e. Operating Foot Controls:     Driving / Operate Machinery:     Physician Name: Elier Castle M.D. Physician Signature: ELIER Gallego M.D. e-Signature: Dr. Carson David, Medical Director   Clinic Name / Location: [address],   [address] [place] [address] Clinic Phone Number: Dept: [phone]   Appointment Time: 11:45 Am Visit Start Time: 11:34 AM   Check-In Time:  11:30 Am Visit Discharge Time: 12:02 PM   Original-Treating Physician or Chiropractor    Page 2-Insurer/TPA    Page 3-Employer    Page 4-Employee

## 2018-08-13 ENCOUNTER — PHYSICAL THERAPY (OUTPATIENT)
Dept: PHYSICAL THERAPY | Facility: MEDICAL CENTER | Age: 48
End: 2018-08-13
Attending: PREVENTIVE MEDICINE
Payer: COMMERCIAL

## 2018-08-13 DIAGNOSIS — M54.16 LUMBAR RADICULOPATHY: ICD-10-CM

## 2018-08-13 PROCEDURE — 97162 PT EVAL MOD COMPLEX 30 MIN: CPT

## 2018-08-13 NOTE — OP THERAPY EVALUATION
Outpatient Physical Therapy  INITIAL EVALUATION    [facility] Outpatient Physical Therapy  [address]  [place] [address]  Phone:  [phone]  Fax:  [phone]    Date of Evaluation: 08/13/2018    Patient: John Moran  YOB: 1970  MRN: [id number]     Referring Provider: Elier Castle M.D.  [address] [place] [address] [address]  [place] [address]   Referring Diagnosis Left lumbar radiculopathy [M54.16]     Time Calculation  Start time: 1030  Stop time: 1130 Time Calculation (min): 60 minutes     Physical Therapy Occurrence Codes    Date of onset of impairment:  7/26/18   Date physical therapy care plan established or reviewed:  8/13/18   Date physical therapy treatment started:  8/13/18          Chief Complaint: Back Injury    Visit Diagnoses     ICD-10-CM   1. 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 back lifting luggage at work  C/o pain L sided lumbar pain SI area radiating into buttock  Pain increased with bending and standing after sitting,decreased with lying  Denies paresthesia or weakness  C/o sleep sometimes disturbed secondary to pain  Pt is working light duties at the moment  He does not have a home stretching or back program   Past Medical History:   Diagnosis Date   • Subcutaneous mass     right scapula     Past Surgical History:   Procedure Laterality Date   • MASS EXCISION GENERAL Right 9/6/2017    Procedure: MASS EXCISION GENERAL - SUBCUTANEOUS, SCAPULA;  Surgeon: Lyle Lucia M.D.;  Location: SURGERY SAME DAY [facility];  Service: General     Social History   Substance Use Topics   • Smoking status: Never Smoker   • Smokeless tobacco: Never Used   • Alcohol use No     Family and Occupational History     Social History   • Marital status:      Spouse name: N/A   • Number of children: N/A   • Years of education: N/A       Objective  L pelvis is higher  Pt sits with weight  on R buttock  ROM flexion 30% decreased pain L si area  Pain L si with R side bending and ext  5/5 muscle strength  On palpation tender L L1-5 and L si  Very tight bilat gluteals  R psoas    Exercises/Treatment  Time-based treatments/modalities:   HEP for gluteal stretches  REIL  Back care posture       Assessment, Response and Plan:   Prognosis: good    Goals:   Short Term Goals:   Undisturbed sleep  painfree lifting and carrying 75 lbs  Short term goal time span:  4-6 weeks      Long Term Goals:    Same as STG  Long term goal time span:  4-6 weeks    Plan:   Planned therapy interventions:  Manual Therapy (CPT 84849), Therapeutic Activities (CPT 19970) and Therapeutic Exercise (CPT 00028)  Frequency:  2x week  Duration in weeks:  6  Duration in visits:  12      Functional Limitation G-Codes and Severity Modifiers      Current:     Goal:       Referring provider co-signature:  I have reviewed this plan of care and my co-signature certifies the need for services.  Certification Dates:   From 08/13/18    To 09/24/18    Physician Signature: ________________________________ Date: ______________

## 2018-08-15 ENCOUNTER — APPOINTMENT (OUTPATIENT)
Dept: PHYSICAL THERAPY | Facility: MEDICAL CENTER | Age: 48
End: 2018-08-15
Attending: PREVENTIVE MEDICINE
Payer: COMMERCIAL

## 2018-08-20 ENCOUNTER — PHYSICAL THERAPY (OUTPATIENT)
Dept: PHYSICAL THERAPY | Facility: MEDICAL CENTER | Age: 48
End: 2018-08-20
Attending: PREVENTIVE MEDICINE
Payer: COMMERCIAL

## 2018-08-20 DIAGNOSIS — M54.16 LUMBAR RADICULOPATHY: ICD-10-CM

## 2018-08-20 PROCEDURE — 97140 MANUAL THERAPY 1/> REGIONS: CPT

## 2018-08-20 NOTE — OP THERAPY DAILY TREATMENT
Outpatient Physical Therapy  DAILY TREATMENT     [facility] Outpatient Physical Therapy  [address]  [place] [address]  Phone:  [phone]  Fax:  [phone]    Date: 08/20/2018    Patient: John Moran  YOB: 1970  MRN: [id number]     Time Calculation  Start time: 1030  Stop time: 1100 Time Calculation (min): 30 minutes     Chief Complaint: No chief complaint on file.    Visit #: 2    SUBJECTIVE:  Back is feeling better    OBJECTIVE:  Current objective measures:     L pelvis higher    Exercises/Treatment  Time-based treatments/modalities:   L leg pull  R hip mobs   Stretches Bi lat gluteals  STM paraspinals   Reil x10    Pain rating before treatment: 1  Pain rating after treatment: 0    ASSESSMENT:   Response to treatment:   R hip very tight lat rotation    PLAN/RECOMMENDATIONS:   Plan for treatment: therapy treatment to continue next visit.  Planned interventions for next visit: continue with current treatment.

## 2018-08-22 ENCOUNTER — APPOINTMENT (OUTPATIENT)
Dept: PHYSICAL THERAPY | Facility: MEDICAL CENTER | Age: 48
End: 2018-08-22
Attending: PREVENTIVE MEDICINE
Payer: COMMERCIAL

## 2018-08-23 ENCOUNTER — OCCUPATIONAL MEDICINE (OUTPATIENT)
Dept: OCCUPATIONAL MEDICINE | Facility: CLINIC | Age: 48
End: 2018-08-23
Payer: COMMERCIAL

## 2018-08-23 VITALS
DIASTOLIC BLOOD PRESSURE: 70 MMHG | OXYGEN SATURATION: 98 % | BODY MASS INDEX: 25.69 KG/M2 | HEIGHT: 63 IN | RESPIRATION RATE: 16 BRPM | TEMPERATURE: 97.4 F | WEIGHT: 145 LBS | SYSTOLIC BLOOD PRESSURE: 120 MMHG | HEART RATE: 74 BPM

## 2018-08-23 DIAGNOSIS — M54.16 LEFT LUMBAR RADICULOPATHY: ICD-10-CM

## 2018-08-23 PROCEDURE — 99214 OFFICE O/P EST MOD 30 MIN: CPT | Performed by: PREVENTIVE MEDICINE

## 2018-08-23 RX ORDER — DICLOFENAC SODIUM 75 MG/1
75 TABLET, DELAYED RELEASE ORAL 2 TIMES DAILY
Qty: 60 TAB | Refills: 1 | Status: SHIPPED | OUTPATIENT
Start: 2018-08-23 | End: 2018-09-24

## 2018-08-23 RX ORDER — TIZANIDINE 4 MG/1
4 TABLET ORAL EVERY 6 HOURS PRN
Qty: 30 TAB | Refills: 3 | Status: SHIPPED | OUTPATIENT
Start: 2018-08-23 | End: 2018-09-24

## 2018-08-23 ASSESSMENT — ENCOUNTER SYMPTOMS
CONSTITUTIONAL NEGATIVE: 1
GASTROINTESTINAL NEGATIVE: 1

## 2018-08-23 ASSESSMENT — PAIN SCALES - GENERAL: PAINLEVEL: 5=MODERATE PAIN

## 2018-08-23 NOTE — PROGRESS NOTES
"Subjective:      John Moran is a 48 y.o. male who presents with Follow-Up ( - DOI 7/26/2018 - Back & Leg injury - Same - Room 3)      Date of injury 7/26/2018.  Mechanism of injury- \"lifted a 40 pound suitcase and was going to load it onto the shoulder when I felt a sharp pain in my lower back, hip, and leg.  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  He indicates he is no better.  Physical therapy is not helping.  He continues to have left-sided lower back pain with radiation to the knee.     HPI    Review of Systems   Constitutional: Negative.    Gastrointestinal: Negative.    Genitourinary: Negative.      PFSH:  WORK STATUS: Restricted activity  PMH:  has a past medical history of Subcutaneous mass.  MEDS:   Current Outpatient Prescriptions:   •  diclofenac EC (VOLTAREN) 75 MG Tablet Delayed Response, Take 1 Tab by mouth 2 times a day., Disp: 60 Tab, Rfl: 1  •  tizanidine (ZANAFLEX) 4 MG Tab, Take 1 Tab by mouth every 6 hours as needed., Disp: 30 Tab, Rfl: 3  •  predniSONE (DELTASONE) 20 MG Tab, Take 1 Tab by mouth 2 times a day., Disp: 14 Tab, Rfl: 0  •  hydrocodone-acetaminophen (NORCO) 5-325 MG Tab per tablet, Take 1-2 Tabs by mouth every 6 hours as needed. (Patient not taking: Reported on 8/2/2018), Disp: 40 Tab, Rfl: 0       Objective:     /70   Pulse 74   Temp 36.3 °C (97.4 °F)   Resp 16   Ht 1.6 m (5' 3\")   Wt 65.8 kg (145 lb)   SpO2 98%   BMI 25.69 kg/m²      Physical Exam    Appearance: Well-developed, well-nourished.   Mental Status: Mood and Affect normal. Pleasant. Cooperative. Appropriate.   Musculoskeletal: Back exam shows left lumbosacral tenderness.  Positive straight leg raise on the left.         Assessment/Plan:     1. Left lumbar radiculopathy  No significant change  - diclofenac EC (VOLTAREN) 75 MG Tablet Delayed Response; Take 1 Tab by mouth 2 times a day.  Dispense: 60 Tab; Refill: 1  - tizanidine (ZANAFLEX) 4 MG Tab; Take 1 Tab by " mouth every 6 hours as needed.  Dispense: 30 Tab; Refill: 3  - MR-LUMBAR SPINE-W/O; Future  - REFERRAL TO RADIOLOGY  - REFERRAL TO PHYSIATRY (PMR) due to lumbar radiculopathy-anticipate epidural steroid/nerve root block  Restricted activity  Recheck in 4 weeks or sooner if MRI accomplished

## 2018-08-23 NOTE — LETTER
"[address],   [address] [place] [address]  Phone:  [phone] - Fax:  [phone]   Occupational Health [facility] Progress Report and Disability Certification  Date of Service: 8/23/2018   No Show:  No  Date / Time of Next Visit: 9/27/2018@3:30 PM   Claim Information   Patient Name: John Moran  Claim Number:     Employer: [employer]  Date of Injury: 7/26/2018     Insurer / TPA: [employer]  ID / SSN:     Occupation:   Diagnosis: The encounter diagnosis was Left lumbar radiculopathy.    Medical Information   Related to Industrial Injury? Yes    Subjective Complaints:  Date of injury 7/26/2018.  Mechanism of injury- \"lifted a 40 pound suitcase and was going to load it onto the shoulder when I felt a sharp pain in my lower back, hip, and leg.  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  He indicates he is no better.  Physical therapy is not helping.  He continues to have left-sided lower back pain with radiation to the knee.   Objective Findings: Appearance: Well-developed, well-nourished.   Mental Status: Mood and Affect normal. Pleasant. Cooperative. Appropriate.   Musculoskeletal: Back exam shows left lumbosacral tenderness.  Positive straight leg raise on the left.     Pre-Existing Condition(s):     Assessment:   Condition Same    Status: Additional Care Required  Permanent Disability:No    Plan: MedicationMedication (NOT at Work)DiagnosticsConsultation    Diagnostics: MRI    Comments:  Physiatry consultation for epidural steroids    Disability Information   Status: Released to Restricted Duty    From:  8/23/2018  Through: 9/27/2018 Restrictions are: Temporary   Physical Restrictions   Sitting:    Standing:    Stooping:    Bending:  < or = to 1 hr/day   Squatting:    Walking:    Climbing:    Pushing:      Pulling:    Other:    Reaching Above Shoulder (L):   Reaching Above " Shoulder (R):       Reaching Below Shoulder (L):    Reaching Below Shoulder (R):      Not to exceed Weight Limits   Carrying(hrs):   Weight Limit(lb):   Lifting(hrs):   Weight  Limit(lb): < or = to 25 pounds   Comments:      Repetitive Actions   Hands: i.e. Fine Manipulations from Grasping:     Feet: i.e. Operating Foot Controls:     Driving / Operate Machinery:     Physician Name: Elier Castle M.D. Physician Signature: ELIER Gallego M.D. e-Signature: Dr. Carson David, Medical Director   Clinic Name / Location: [address],   [address] [place] [address], [place] [address] Clinic Phone Number: Dept: [phone]   Appointment Time: 3:45 Pm Visit Start Time: 3:40 PM   Check-In Time:  3:33 Pm Visit Discharge Time:  4:40 PM   Original-Treating Physician or Chiropractor    Page 2-Insurer/TPA    Page 3-Employer    Page 4-Employee

## 2018-08-27 ENCOUNTER — PHYSICAL THERAPY (OUTPATIENT)
Dept: PHYSICAL THERAPY | Facility: MEDICAL CENTER | Age: 48
End: 2018-08-27
Attending: PREVENTIVE MEDICINE
Payer: COMMERCIAL

## 2018-08-27 DIAGNOSIS — M54.16 LUMBAR RADICULOPATHY: ICD-10-CM

## 2018-08-27 PROCEDURE — 97110 THERAPEUTIC EXERCISES: CPT

## 2018-08-27 PROCEDURE — 97140 MANUAL THERAPY 1/> REGIONS: CPT

## 2018-08-27 NOTE — OP THERAPY DAILY TREATMENT
Outpatient Physical Therapy  Workers Compensation   DAILY TREATMENT     [facility] Outpatient Physical Therapy  [address]  [place] [address]  Phone:  [phone]  Fax:  [phone]    Date: 08/27/2018    Patient: John Moran  YOB: 1970  MRN: [id number]     Time Calculation  Start time: 1145  Stop time: 1229 Time Calculation (min): 44 minutes     Chief Complaint: Back Problem and Leg Pain    Visit #: 3    SUBJECTIVE:  I thought PT would help after first visit, but after second visit I did not have any improvement.  I am having a MRI tomorrow.  Pain 5/10 constant at L/S and into left quad.      OBJECTIVE:  Current objective measures: L/S flex to knees with pain  Ext 50%  Sidebend fingers touch mid thigh with pain both to L and R          Therapeutic Exercises (CPT 01084):     1. Review of HEP    2. Prone press up, 10    3. Prone glute squeeze, 10    4. Standing L/S ext at counter, 10    Therapeutic Treatments and Modalities:     1. Manual Therapy (CPT 98001), 20 min, L/S rot mobs gr II sidelying R, L/S manual traction with belt, STM to L/S paraspinals and gr I sideglides to open L 3-L5, both manual treatments had minor changes to pts symptoms with increased pain in L/S and less in leg temporarily     2. Hot or Cold Pack Therapy (CPT 56778), MHP to L/S , no charge, pt reporting ice not helpful at home,but heat helps with pain reduction.    Time-based treatments/modalities:  Manual therapy minutes (CPT 97060): 20 minutes  Therapeutic exercise minutes (CPT 23123): 10 minutes       Pain rating before treatment: 5 in low back  Pain rating after treatment: 6 in low back, slight improvement in leg symptoms    ASSESSMENT:   Response to treatment: pt had some improvement in leg symptoms temporarily during MT.  Pt with high irritable symptoms that provoke easily and reduce slowly.  Continue with PT to address pain relief and function  restoration.    PLAN/RECOMMENDATIONS:   Plan for treatment: therapy treatment to continue next visit.  Planned interventions for next visit: continue with current treatment.

## 2018-08-28 ENCOUNTER — HOSPITAL ENCOUNTER (OUTPATIENT)
Dept: RADIOLOGY | Facility: MEDICAL CENTER | Age: 48
End: 2018-08-28
Attending: PREVENTIVE MEDICINE
Payer: COMMERCIAL

## 2018-08-28 DIAGNOSIS — M54.16 LEFT LUMBAR RADICULOPATHY: ICD-10-CM

## 2018-08-28 PROCEDURE — 72148 MRI LUMBAR SPINE W/O DYE: CPT

## 2018-08-29 ENCOUNTER — PHYSICAL THERAPY (OUTPATIENT)
Dept: PHYSICAL THERAPY | Facility: MEDICAL CENTER | Age: 48
End: 2018-08-29
Attending: PREVENTIVE MEDICINE
Payer: COMMERCIAL

## 2018-08-29 DIAGNOSIS — M54.16 LUMBAR RADICULOPATHY: ICD-10-CM

## 2018-08-29 PROCEDURE — 97140 MANUAL THERAPY 1/> REGIONS: CPT

## 2018-08-29 PROCEDURE — 97110 THERAPEUTIC EXERCISES: CPT

## 2018-08-30 NOTE — OP THERAPY DAILY TREATMENT
Outpatient Physical Therapy  Workers Compensation   DAILY TREATMENT     [facility] Outpatient Physical Therapy  [address]  [place] [address]  Phone:  [phone]  Fax:  [phone]    Date: 08/29/2018    Patient: John Moran  YOB: 1970  MRN: [id number]     Time Calculation  Start time: 1700  Stop time: 1730 Time Calculation (min): 30 minutes     Chief Complaint: Back Problem    Visit #: 4    SUBJECTIVE:  Burning left leg today. Will change work from 4 10's to 5 8's so his appointments may change. He stands heavy on R leg.     OBJECTIVE:  Current objective measures:     Therapeutic Exercises (CPT 93083):     1. Nu step , for movement, no increase in symptoms     2. Standing ext and ext with slight forward and r shift    Therapeutic Treatments and Modalities:     1. Manual Therapy (CPT 53261), Lb, Left side , STM to L side erectors, Sidelying lying glute med peel, pin and stretch, TFL, glute med. Isometric hold to abduction and ext.     Time-based treatments/modalities:  Manual therapy minutes (CPT 70366): 20 minutes  Therapeutic exercise minutes (CPT 79830): 10 minutes       Pain rating before treatment: Not rated   Pain rating after treatment:     ASSESSMENT:   Response to treatment: Pt with reduction of symptoms from foot to mid leg/knee. Reminded to continue with standing ext several times a day to modulate symptoms. Needs to improve pelvic and trunk control. Continue with PT to address pain relief and function restoration.    PLAN/RECOMMENDATIONS:   Plan for treatment: therapy treatment to continue next visit.  Planned interventions for next visit: continue with current treatment.

## 2018-09-04 ENCOUNTER — PHYSICAL THERAPY (OUTPATIENT)
Dept: PHYSICAL THERAPY | Facility: MEDICAL CENTER | Age: 48
End: 2018-09-04
Attending: PREVENTIVE MEDICINE
Payer: COMMERCIAL

## 2018-09-04 DIAGNOSIS — M54.16 LUMBAR RADICULOPATHY: ICD-10-CM

## 2018-09-04 PROCEDURE — 97110 THERAPEUTIC EXERCISES: CPT

## 2018-09-04 PROCEDURE — 97140 MANUAL THERAPY 1/> REGIONS: CPT

## 2018-09-04 NOTE — OP THERAPY DAILY TREATMENT
"  Outpatient Physical Therapy  Workers Compensation   DAILY TREATMENT     [facility] Outpatient Physical Therapy  [address]  [place] [address]  Phone:  [phone]  Fax:  [phone]    Date: 09/04/2018    Patient: John Moran  YOB: 1970  MRN: [id number]     Time Calculation  Start time: 1525  Stop time: 1605 Time Calculation (min): 40 minutes     Chief Complaint: Back Problem    Visit #: 5    SUBJECTIVE:  Pt states his L leg is still burning. It used to be intermittent, but since the last session his leg has been constantly burning. There is nothing he can do to relieve it. He has worked out something with work to be able to do the exercises every hour.     OBJECTIVE:  Current objective measures:   (+) bilateral slump tests L>R    Therapeutic Exercises (CPT 95590):     1. Nu step , for movement, no increase in symptoms , 3 min, level 5    2. ADIM, 5\" x 10 x 1    3. Hamstring gilde - supine, 10 x 1    4. SKTC, 30\" x 1    Therapeutic Treatments and Modalities:     1. Manual Therapy (CPT 64684), R SL: Opening gap, grade 1, 3 min. R SL: opening gap mobs to L1-5, grade 2-3, 30\" x 3, R SL: opening mob (caudal pressure to hip), grade 2-3, 30\" x 5, slight relief in burning pain, Manual traction, grade 2-3, 30\" x 5, slight relief in burning pain, Manual hamstring glide, grade 1, contra stretch, increased pain with contralateral stretching, relief upon release of stretch    Time-based treatments/modalities:  Manual therapy minutes (CPT 49030): 20 minutes  Therapeutic exercise minutes (CPT 58621): 10 minutes       Pain rating before treatment: Constant burning  Pain rating after treatment: Same as when he arrived, but had relief with techniques    ASSESSMENT:   Pt reports constant burning sensation in his LLE. Pt had relief in burning symptoms with opening gap and and manual traction during the technique but no lasting relief. Pt will benefit from continued skilled " physical therapy to continue to reduce referral pain, and increase patient's core and hip strength.    PLAN/RECOMMENDATIONS:   Plan for treatment: therapy treatment to continue next visit.  Planned interventions for next visit: continue with current treatment. Repeat opening gap and manual traction, try a higher grade, try progressing to level 2 mobs. Progress core and hip strength. Progress note is due at next visit.

## 2018-09-13 ENCOUNTER — PHYSICAL THERAPY (OUTPATIENT)
Dept: PHYSICAL THERAPY | Facility: MEDICAL CENTER | Age: 48
End: 2018-09-13
Attending: PREVENTIVE MEDICINE
Payer: COMMERCIAL

## 2018-09-13 DIAGNOSIS — M54.16 LUMBAR RADICULOPATHY: ICD-10-CM

## 2018-09-13 PROCEDURE — 97110 THERAPEUTIC EXERCISES: CPT

## 2018-09-13 PROCEDURE — 97140 MANUAL THERAPY 1/> REGIONS: CPT

## 2018-09-13 NOTE — OP THERAPY DAILY TREATMENT
"  Outpatient Physical Therapy  Workers Compensation   DAILY TREATMENT     [facility] Outpatient Physical Therapy  [address]  [place] [address]  Phone:  [phone]  Fax:  [phone]    Date: 09/13/2018    Patient: John Moran  YOB: 1970  MRN: [id number]     Time Calculation  Start time: 1500  Stop time: 1540 Time Calculation (min): 40 minutes     Chief Complaint: Low Back Pain and Leg Pain    Visit #: 6    SUBJECTIVE:  Pt reports that he is consistent with all of his exercises at home/work however if he has cramping or pain then he will stop them. Pt continues to complain of pain in his left glute/HS; states that this was much better following his last visit.    OBJECTIVE:  Current objective measures:   (+) bilateral slump tests L>R    Therapeutic Exercises (CPT 10581):     1. Nu step , L4x5 min    2. ADIM, 5\" x 10 x 1, attempted with low marches, but patient reporting spasm/cramping along right lumbar musculature    3. Hamstring gilde - supine, 10 x 1    4. SKTC, 30\" x 1    5. Piriformis stretch, 2x30\" on the left    Therapeutic Treatments and Modalities:     1. Manual Therapy (CPT 54814), REIL with left SB bias: GR IV transverse mob inferior glides, slight decrease in symptoms, REIL: opening mob (caudal pressure to hip), grade 2-3, 30\" x 5, slight relief in burning pain, REIL with left SB and MHP x10 minutes    Time-based treatments/modalities:  Manual therapy minutes (CPT 21098): 10 minutes  Therapeutic exercise minutes (CPT 18297): 20 minutes       Pain rating before treatment: Burning in glute and hamstring  Pain rating after treatment: Pt left before pain could be reassessed    ASSESSMENT:   Pt demos mild improvement    PLAN/RECOMMENDATIONS:   Plan for treatment: therapy treatment to continue next visit.  Planned interventions for next visit: continue with current treatment. Repeat opening gap and manual traction and reassess prone/extension with " left SB bias. Progress core and hip strength.

## 2018-09-18 ENCOUNTER — APPOINTMENT (OUTPATIENT)
Dept: PHYSICAL THERAPY | Facility: MEDICAL CENTER | Age: 48
End: 2018-09-18
Attending: PREVENTIVE MEDICINE
Payer: COMMERCIAL

## 2018-09-24 ENCOUNTER — OFFICE VISIT (OUTPATIENT)
Dept: MEDICAL GROUP | Facility: MEDICAL CENTER | Age: 48
End: 2018-09-24
Payer: COMMERCIAL

## 2018-09-24 VITALS
HEIGHT: 63 IN | SYSTOLIC BLOOD PRESSURE: 122 MMHG | RESPIRATION RATE: 18 BRPM | TEMPERATURE: 97.5 F | OXYGEN SATURATION: 98 % | DIASTOLIC BLOOD PRESSURE: 72 MMHG | HEART RATE: 76 BPM | WEIGHT: 147.71 LBS | BODY MASS INDEX: 26.17 KG/M2

## 2018-09-24 DIAGNOSIS — R06.81 APNEA: ICD-10-CM

## 2018-09-24 DIAGNOSIS — Z00.00 ANNUAL PHYSICAL EXAM: ICD-10-CM

## 2018-09-24 PROBLEM — R22.9 LOCALIZED SUPERFICIAL SWELLING, MASS, OR LUMP: Status: RESOLVED | Noted: 2017-09-06 | Resolved: 2018-09-24

## 2018-09-24 PROCEDURE — 99386 PREV VISIT NEW AGE 40-64: CPT | Performed by: FAMILY MEDICINE

## 2018-09-24 ASSESSMENT — PATIENT HEALTH QUESTIONNAIRE - PHQ9: CLINICAL INTERPRETATION OF PHQ2 SCORE: 0

## 2018-09-24 NOTE — PROGRESS NOTES
"This medical record contains text that has been entered with the assistance of computer voice recognition and dictation software.  Therefore, it may contain unintended errors in text, spelling, punctuation, or grammar        Chief Complaint   Patient presents with   • Annual Exam     patient states he wants a physical to make sure he is ok   • Orders Needed     labs       John Moran is a 48 y.o. male here evaluation and management of:   He has h/o back injury workers comp, doing well currently     He feels well in his usual state of health   No unusual chest pain fatigue headache  No n/v  No fever  No weight changes          No current outpatient prescriptions on file.     No current facility-administered medications for this visit.      Patient Active Problem List    Diagnosis Date Noted   • Annual physical exam 09/24/2018   • Apnea 09/24/2018     Past Surgical History:   Procedure Laterality Date   • MASS EXCISION GENERAL Right 9/6/2017    Procedure: MASS EXCISION GENERAL - SUBCUTANEOUS, SCAPULA;  Surgeon: Lyle Lucia M.D.;  Location: SURGERY SAME DAY [facility];  Service: General      Social History   Substance Use Topics   • Smoking status: Never Smoker   • Smokeless tobacco: Never Used   • Alcohol use No     History reviewed. No pertinent family history.        ROS    all review of system completed and negative except for those listed above     Objective:     Blood pressure 122/72, pulse 76, temperature 36.4 °C (97.5 °F), temperature source Temporal, resp. rate 18, height 1.6 m (5' 3\"), weight 67 kg (147 lb 11.3 oz), SpO2 98 %. Body mass index is 26.17 kg/m².  Physical Exam:    Constitutional: Alert, no distress.  Skin: Warm, dry, good turgor, no rashes in visible areas.  Eye: Equal, round and reactive, conjunctiva clear, lids normal.  ENMT: Lips without lesions, good dentition, oropharynx clear.  Neck: Trachea midline, no masses, no thyromegaly. No cervical or supraclavicular " lymphadenopathy.  Respiratory: Unlabored respiratory effort, lungs clear to auscultation, no wheezes, no ronchi.  Cardiovascular: Normal S1, S2, no murmur, no edema.  Abdomen: Soft, non-tender, no masses, no hepatosplenomegaly.  Psych: Alert and oriented x3, normal affect and mood.              Assessment and Plan:   The following treatment plan was discussed        Problem List Items Addressed This Visit     Annual physical exam     Labs ordered  Preventative health care and healthy lifestyle discussed  Follow up 1 year              Relevant Orders    LDL, DIRECT    HDL CHOLESTEROL    CHOLESTEROL    BASIC METABOLIC PANEL    Apnea     Has high mallampali score  Ref to sleep studies to screen for sleep apnea             Relevant Orders    REFERRAL TO SLEEP STUDIES                Instructed to follow up if symptoms worsen or fail to improve, ER/UC precautions discussed as well    Debbie Niño MD  [facility], Family Medicine   [address] [place] [address]   [place] [address]  Phone: [phone]

## 2018-09-26 ENCOUNTER — PHYSICAL THERAPY (OUTPATIENT)
Dept: PHYSICAL THERAPY | Facility: MEDICAL CENTER | Age: 48
End: 2018-09-26
Attending: PREVENTIVE MEDICINE
Payer: COMMERCIAL

## 2018-09-26 DIAGNOSIS — M54.16 LUMBAR RADICULOPATHY: ICD-10-CM

## 2018-09-26 PROCEDURE — 97110 THERAPEUTIC EXERCISES: CPT

## 2018-09-27 NOTE — PATIENT INSTRUCTIONS
Patient encouraged to continue to perform HEP regularly to reduce risk of recurrence. Patient provided with handout of new HEP and pictures/instructions for good lifting technique.

## 2018-09-27 NOTE — OP THERAPY DAILY TREATMENT
Outpatient Physical Therapy  DAILY TREATMENT     [facility] Outpatient Physical Therapy  [address]  [place] [address]  Phone:  [phone]  Fax:  [phone]    Date: 09/26/2018    Patient: John Moran  YOB: 1970  MRN: [id number]     Time Calculation  Start time: 0445  Stop time: 0530 Time Calculation (min): 45 minutes     Chief Complaint: Back Problem    Visit #: 7    SUBJECTIVE:  The patient is currently symptom free. He has had no symptoms for the past week. He will be returning to full duty at work in the coming weeks.     OBJECTIVE:  Current objective measures:           Therapeutic Exercises (CPT 49209):     1. Nustepper L4 5 min    2. HS glide , Supine 30 sec. x 2    3. HS stretch in sitting, 30 sec. ea. side x 2, Added to HEP    4. HS stretch in standing , 30 sec. ea. side x 2, Added to HEP    5. Piriformis stretch , 30 sec. ea. side x 2    6. 90/90, 3 with 15 sec. hold x 1, Added to HEP    7. Kneeling hip flexor stretch , 30 sec. ea. side x 2    Therapeutic Treatments and Modalities:     1. Therapeutic Activities (CPT 65722), Patient education and practice in body mechanics in lifting , Handout with instructions provided    Time-based treatments/modalities:  Therapeutic exercise minutes (CPT 27826): 30 minutes       Pain rating before treatment: 0  Pain rating after treatment: 0    ASSESSMENT:   Response to treatment: No symptom provocation with treatment. Provided the patient with alternative methods of stretching HS, so he will be able to continue to include this stretch in his daily activities frequently. The patient exhibited good body mechanics with faded cueing when practicing lifting techniques. The patient has been symptom free for 1 week, is independent in HEP and is ready for discharge. The patient is in agreement with this plan.    PLAN/RECOMMENDATIONS:   Plan for treatment: discharge patient due to accomplished goals.

## 2018-09-28 NOTE — OP THERAPY DISCHARGE SUMMARY
Outpatient Physical Therapy  DISCHARGE SUMMARY NOTE      [facility] Outpatient Physical Therapy  [address]  [place] [address]  Phone:  [phone]  Fax:  [phone]    Date of Visit: 09/26/2018    Patient: John Moran  YOB: 1970  MRN: [id number]     Referring Provider: Elier Castle M.D.  [address] [place] [address] [address]  [place] [address]   Referring Diagnosis Radiculopathy, lumbar region [M54.16]     Physical Therapy Occurrence Codes    Date of onset of impairment:  7/26/18   Date physical therapy care plan established or reviewed:  8/13/18   Date physical therapy treatment started:  8/13/18          Functional Limitation G-Codes and Severity Modifiers      Goal:     Discharge:         Your patient is being discharged from Physical Therapy with the following comments:   · The patient has been seen for 7 visits to physical therapy to date, including today's visit, to treat impairments and functional limitations associated with L lumbar radiculopathy. Treatment has included manual therapy, ROM, strengthening/endurance, and stretching. The patient is independent with his HEP and has had no symptoms for 1 week. He has met all goals excepting goal to lift 70 lbs without symptom provocation, but he has steadily progressed towards achieving this goal and is expected to achieve it after discharge. Proper lifting body mechanics review at this session and patient was independent in performing.     Comments:  New HEP handout issued, along with pictures/instructions for lifting technique.    Limitations Remaining:  Muscle length impaired: hamstrings, piriformis.     Recommendations:  Continue with independent HEP with focus on stretching and core strengthening to maintain gains and prevent recurrence.    Nirali Sanchez, PT    Date: 9/27/2018

## 2018-10-12 ENCOUNTER — HOSPITAL ENCOUNTER (OUTPATIENT)
Dept: LAB | Facility: MEDICAL CENTER | Age: 48
End: 2018-10-12
Attending: FAMILY MEDICINE
Payer: COMMERCIAL

## 2018-10-12 DIAGNOSIS — Z00.00 ANNUAL PHYSICAL EXAM: ICD-10-CM

## 2018-10-12 LAB
ANION GAP SERPL CALC-SCNC: 9 MMOL/L (ref 0–11.9)
BUN SERPL-MCNC: 19 MG/DL (ref 8–22)
CALCIUM SERPL-MCNC: 9 MG/DL (ref 8.5–10.5)
CHLORIDE SERPL-SCNC: 104 MMOL/L (ref 96–112)
CHOLEST SERPL-MCNC: 222 MG/DL (ref 100–199)
CO2 SERPL-SCNC: 26 MMOL/L (ref 20–33)
CREAT SERPL-MCNC: 0.96 MG/DL (ref 0.5–1.4)
FASTING STATUS PATIENT QL REPORTED: NORMAL
GLUCOSE SERPL-MCNC: 108 MG/DL (ref 65–99)
HDLC SERPL-MCNC: 46 MG/DL
POTASSIUM SERPL-SCNC: 4.1 MMOL/L (ref 3.6–5.5)
SODIUM SERPL-SCNC: 139 MMOL/L (ref 135–145)

## 2018-10-12 PROCEDURE — 83721 ASSAY OF BLOOD LIPOPROTEIN: CPT

## 2018-10-12 PROCEDURE — 83718 ASSAY OF LIPOPROTEIN: CPT

## 2018-10-12 PROCEDURE — 82465 ASSAY BLD/SERUM CHOLESTEROL: CPT

## 2018-10-12 PROCEDURE — 36415 COLL VENOUS BLD VENIPUNCTURE: CPT

## 2018-10-12 PROCEDURE — 80048 BASIC METABOLIC PNL TOTAL CA: CPT

## 2018-10-15 LAB — LDLC SERPL-MCNC: 175 MG/DL (ref 0–129)

## 2018-10-22 ENCOUNTER — OFFICE VISIT (OUTPATIENT)
Dept: MEDICAL GROUP | Facility: MEDICAL CENTER | Age: 48
End: 2018-10-22
Payer: COMMERCIAL

## 2018-10-22 VITALS
OXYGEN SATURATION: 95 % | SYSTOLIC BLOOD PRESSURE: 100 MMHG | HEIGHT: 63 IN | WEIGHT: 149.6 LBS | TEMPERATURE: 98.6 F | BODY MASS INDEX: 26.51 KG/M2 | DIASTOLIC BLOOD PRESSURE: 62 MMHG | RESPIRATION RATE: 16 BRPM | HEART RATE: 80 BPM

## 2018-10-22 DIAGNOSIS — L82.1 SK (SEBORRHEIC KERATOSIS): ICD-10-CM

## 2018-10-22 DIAGNOSIS — E78.00 PURE HYPERCHOLESTEROLEMIA: ICD-10-CM

## 2018-10-22 DIAGNOSIS — R73.09 ELEVATED GLUCOSE: ICD-10-CM

## 2018-10-22 PROBLEM — Z00.00 ANNUAL PHYSICAL EXAM: Status: RESOLVED | Noted: 2018-09-24 | Resolved: 2018-10-22

## 2018-10-22 PROCEDURE — 99213 OFFICE O/P EST LOW 20 MIN: CPT | Mod: 25 | Performed by: FAMILY MEDICINE

## 2018-10-22 PROCEDURE — 17110 DESTRUCTION B9 LES UP TO 14: CPT | Performed by: FAMILY MEDICINE

## 2018-10-22 NOTE — ASSESSMENT & PLAN NOTE
Patient has been complaining of lesions which have been irritating, itchy getting caught on thomas/chlothing, so is interested in removal.      SKIN EXAM  Several well-demarcated, round/oval lesions with a dull, verrucous surface and irregular stuck-on appearance on back and chest and forehead           PROCEDURE: CRYOTHERAPY  Discussed risks and benefits of cryotherapy including but not limited to scarring, hyperpigmentation, hypopigmentation, hypertrophic scarring, keiloid scarring, incomplete or no resolution of lesions treated,pain, undesirable cosemetic result, blistering, potential need for additional treatment including more invasive treatment. Patient expresses understanding and verbally acknowledges risks and consent to treatment. 2  applications of cryotherapy with 3 second freeze thaw cycle was applied to  0AK's and  15SK's and 0skin tags. Patient tolerated procedure well. There were no complications. Aftercare instructions given.

## 2018-10-22 NOTE — PROGRESS NOTES
"This medical record contains text that has been entered with the assistance of computer voice recognition and dictation software.  Therefore, it may contain unintended errors in text, spelling, punctuation, or grammar        Chief Complaint   Patient presents with   • Follow-Up     labs   • Other     skin tags       John Moran is a 48 y.o. male here evaluation and management of:  Patient is presenting requesting that I evaluate and treat some itchy skin lesions on his neck forehead   Present for years more growing   See s/p for further details   Also follow up on two new issues hyperlipidemia and elevated glucose        No current outpatient prescriptions on file.     No current facility-administered medications for this visit.      Patient Active Problem List    Diagnosis Date Noted   • Pure hypercholesterolemia 10/22/2018   • Elevated glucose 10/22/2018   • SK (seborrheic keratosis) 10/22/2018   • Apnea 09/24/2018     Past Surgical History:   Procedure Laterality Date   • MASS EXCISION GENERAL Right 9/6/2017    Procedure: MASS EXCISION GENERAL - SUBCUTANEOUS, SCAPULA;  Surgeon: Lyle Lucia M.D.;  Location: SURGERY SAME DAY [facility];  Service: General      Social History   Substance Use Topics   • Smoking status: Never Smoker   • Smokeless tobacco: Never Used   • Alcohol use No     No family history on file.        ROS  No n/v  all review of system completed and negative except for those listed above     Objective:     Blood pressure 100/62, pulse 80, temperature 37 °C (98.6 °F), temperature source Temporal, resp. rate 16, height 1.6 m (5' 3\"), weight 67.9 kg (149 lb 9.6 oz), SpO2 95 %. Body mass index is 26.5 kg/m².  Physical Exam:      GEN: comfortable, alert and oriented, well nourished, well developed, in no apparent distress   HEENT: NCAT, eyes: pupils equal and reactive, sclera white, EOMIT, good dentition  HEART: limbs warm and well perfused, regular rate, no JVD, no lower extremity " edema  LUNGS: speaking in full sentences, not in apparent respiratory distress, no audible wheezes  MSK: normal tone and bulk, no swelling of the joints, gait steady and normal     Skin see below  Labs reviewed            Assessment and Plan:   The following treatment plan was discussed        Problem List Items Addressed This Visit     Pure hypercholesterolemia     10 year risk 4.3% so not statin candidate  Diet exercise discussed  Follow up 6 mo-1 year labs prior   15+ min face to face discussing preventative health counseling               Relevant Orders    LDL, DIRECT    HDL CHOLESTEROL    CHOLESTEROL    BASIC METABOLIC PANEL    HEMOGLOBIN A1C    Elevated glucose     Will include a1c with next set of labs             Relevant Orders    LDL, DIRECT    HDL CHOLESTEROL    CHOLESTEROL    BASIC METABOLIC PANEL    HEMOGLOBIN A1C    SK (seborrheic keratosis)     Patient has been complaining of lesions which have been irritating, itchy getting caught on thomas/chlothing, so is interested in removal.      SKIN EXAM  Several well-demarcated, round/oval lesions with a dull, verrucous surface and irregular stuck-on appearance on back and chest and forehead           PROCEDURE: CRYOTHERAPY  Discussed risks and benefits of cryotherapy including but not limited to scarring, hyperpigmentation, hypopigmentation, hypertrophic scarring, keiloid scarring, incomplete or no resolution of lesions treated,pain, undesirable cosemetic result, blistering, potential need for additional treatment including more invasive treatment. Patient expresses understanding and verbally acknowledges risks and consent to treatment. 2  applications of cryotherapy with 3 second freeze thaw cycle was applied to  0AK's and  15SK's and 0skin tags. Patient tolerated procedure well. There were no complications. Aftercare instructions given.                                   Instructed to follow up if symptoms worsen or fail to improve, ER/UC precautions  discussed as well    Debbie Niño MD  [facility]   [address] [place] [address] [address]   [place] [address]  Phone: [phone]

## 2018-10-22 NOTE — ASSESSMENT & PLAN NOTE
10 year risk 4.3% so not statin candidate  Diet exercise discussed  Follow up 6 mo-1 year labs prior   15+ min face to face discussing preventative health counseling

## 2018-11-28 ENCOUNTER — OFFICE VISIT (OUTPATIENT)
Dept: MEDICAL GROUP | Facility: MEDICAL CENTER | Age: 48
End: 2018-11-28
Payer: COMMERCIAL

## 2018-11-28 VITALS
OXYGEN SATURATION: 97 % | TEMPERATURE: 98.2 F | BODY MASS INDEX: 27.39 KG/M2 | SYSTOLIC BLOOD PRESSURE: 100 MMHG | HEIGHT: 63 IN | HEART RATE: 76 BPM | DIASTOLIC BLOOD PRESSURE: 52 MMHG | RESPIRATION RATE: 12 BRPM | WEIGHT: 154.6 LBS

## 2018-11-28 DIAGNOSIS — L82.1 SK (SEBORRHEIC KERATOSIS): ICD-10-CM

## 2018-11-28 DIAGNOSIS — L91.8 SKIN TAG: ICD-10-CM

## 2018-11-28 PROCEDURE — 17110 DESTRUCTION B9 LES UP TO 14: CPT | Performed by: FAMILY MEDICINE

## 2018-11-28 NOTE — PROGRESS NOTES
"This medical record contains text that has been entered with the assistance of computer voice recognition and dictation software.  Therefore, it may contain unintended errors in text, spelling, punctuation, or grammar        Chief Complaint   Patient presents with   • Follow-Up     warts frozen off at previous visit       John Moran is a 48 y.o. male here evaluation and management of:  Patient is presenting requesting that treat some more itchy skin lesions on his neck forehead , he also wants ref to derm for skin tag on eyelid   Present for years more growing   See s/p for further details               No current outpatient prescriptions on file.     No current facility-administered medications for this visit.      Patient Active Problem List    Diagnosis Date Noted   • Skin tag 11/28/2018   • Pure hypercholesterolemia 10/22/2018   • Elevated glucose 10/22/2018   • SK (seborrheic keratosis) 10/22/2018   • Apnea 09/24/2018     Past Surgical History:   Procedure Laterality Date   • MASS EXCISION GENERAL Right 9/6/2017    Procedure: MASS EXCISION GENERAL - SUBCUTANEOUS, SCAPULA;  Surgeon: Lyle Lucia M.D.;  Location: SURGERY SAME DAY [facility];  Service: General      Social History   Substance Use Topics   • Smoking status: Never Smoker   • Smokeless tobacco: Never Used   • Alcohol use No     No family history on file.        ROS  No n/v  all review of system completed and negative except for those listed above     Objective:     Blood pressure 100/52, pulse 76, temperature 36.8 °C (98.2 °F), temperature source Temporal, resp. rate 12, height 1.6 m (5' 3\"), weight 70.1 kg (154 lb 9.6 oz), SpO2 97 %. Body mass index is 27.39 kg/m².  Physical Exam:      GEN: comfortable, alert and oriented, well nourished, well developed, in no apparent distress   HEENT: NCAT, eyes: pupils equal and reactive, sclera white, EOMIT, good dentition  HEART: limbs warm and well perfused, regular rate, no JVD, no lower " extremity edema  LUNGS: speaking in full sentences, not in apparent respiratory distress, no audible wheezes  MSK: normal tone and bulk, no swelling of the joints, gait steady and normal     Skin see below  Labs reviewed            Assessment and Plan:   The following treatment plan was discussed        Problem List Items Addressed This Visit     SK (seborrheic keratosis)     Patient has been complaining of lesions which have been irritating, itchy getting caught on thomas/chlothing, so is interested in removal.      SKIN EXAM  Several well-demarcated, round/oval lesions with a dull, verrucous surface and irregular stuck-on appearance on back and chest and forehead           PROCEDURE: CRYOTHERAPY  Discussed risks and benefits of cryotherapy including but not limited to scarring, hyperpigmentation, hypopigmentation, hypertrophic scarring, keiloid scarring, incomplete or no resolution of lesions treated,pain, undesirable cosemetic result, blistering, potential need for additional treatment including more invasive treatment. Patient expresses understanding and verbally acknowledges risks and consent to treatment. 2  applications of cryotherapy with 3 second freeze thaw cycle was applied to  0AK's and  15SK's and 0skin tags. Patient tolerated procedure well. There were no complications. Aftercare instructions given.                       Relevant Orders    REFERRAL TO DERMATOLOGY    Skin tag     Of eyelid, ref to derm                          Instructed to follow up if symptoms worsen or fail to improve, ER/UC precautions discussed as well    Debbie Niño MD  [facility], [address]   [place] [address]  Phone: [phone]

## 2019-02-04 ENCOUNTER — APPOINTMENT (OUTPATIENT)
Dept: SLEEP MEDICINE | Facility: MEDICAL CENTER | Age: 49
End: 2019-02-04
Payer: COMMERCIAL

## 2019-09-20 ENCOUNTER — APPOINTMENT (OUTPATIENT)
Dept: RADIOLOGY | Facility: MEDICAL CENTER | Age: 49
End: 2019-09-20
Attending: EMERGENCY MEDICINE
Payer: COMMERCIAL

## 2019-09-20 ENCOUNTER — APPOINTMENT (OUTPATIENT)
Dept: RADIOLOGY | Facility: MEDICAL CENTER | Age: 49
End: 2019-09-20
Payer: COMMERCIAL

## 2019-09-20 ENCOUNTER — HOSPITAL ENCOUNTER (EMERGENCY)
Facility: MEDICAL CENTER | Age: 49
End: 2019-09-21
Attending: EMERGENCY MEDICINE
Payer: COMMERCIAL

## 2019-09-20 VITALS
DIASTOLIC BLOOD PRESSURE: 56 MMHG | SYSTOLIC BLOOD PRESSURE: 96 MMHG | BODY MASS INDEX: 25.89 KG/M2 | RESPIRATION RATE: 16 BRPM | OXYGEN SATURATION: 94 % | WEIGHT: 146.16 LBS | HEART RATE: 69 BPM | TEMPERATURE: 97.2 F

## 2019-09-20 DIAGNOSIS — R10.13 EPIGASTRIC ABDOMINAL PAIN: ICD-10-CM

## 2019-09-20 LAB
ALBUMIN SERPL BCP-MCNC: 5 G/DL (ref 3.2–4.9)
ALBUMIN/GLOB SERPL: 1.6 G/DL
ALP SERPL-CCNC: 98 U/L (ref 30–99)
ALT SERPL-CCNC: 32 U/L (ref 2–50)
ANION GAP SERPL CALC-SCNC: 9 MMOL/L (ref 0–11.9)
AST SERPL-CCNC: 25 U/L (ref 12–45)
BASOPHILS # BLD AUTO: 0.5 % (ref 0–1.8)
BASOPHILS # BLD: 0.04 K/UL (ref 0–0.12)
BILIRUB SERPL-MCNC: 0.7 MG/DL (ref 0.1–1.5)
BUN SERPL-MCNC: 16 MG/DL (ref 8–22)
CALCIUM SERPL-MCNC: 9.9 MG/DL (ref 8.5–10.5)
CHLORIDE SERPL-SCNC: 102 MMOL/L (ref 96–112)
CO2 SERPL-SCNC: 26 MMOL/L (ref 20–33)
CREAT SERPL-MCNC: 0.92 MG/DL (ref 0.5–1.4)
EKG IMPRESSION: NORMAL
EOSINOPHIL # BLD AUTO: 0.21 K/UL (ref 0–0.51)
EOSINOPHIL NFR BLD: 2.4 % (ref 0–6.9)
ERYTHROCYTE [DISTWIDTH] IN BLOOD BY AUTOMATED COUNT: 43.2 FL (ref 35.9–50)
GLOBULIN SER CALC-MCNC: 3.2 G/DL (ref 1.9–3.5)
GLUCOSE SERPL-MCNC: 95 MG/DL (ref 65–99)
HCT VFR BLD AUTO: 49.1 % (ref 42–52)
HGB BLD-MCNC: 16.4 G/DL (ref 14–18)
IMM GRANULOCYTES # BLD AUTO: 0.02 K/UL (ref 0–0.11)
IMM GRANULOCYTES NFR BLD AUTO: 0.2 % (ref 0–0.9)
LIPASE SERPL-CCNC: 20 U/L (ref 11–82)
LYMPHOCYTES # BLD AUTO: 1.96 K/UL (ref 1–4.8)
LYMPHOCYTES NFR BLD: 22.8 % (ref 22–41)
MCH RBC QN AUTO: 28.5 PG (ref 27–33)
MCHC RBC AUTO-ENTMCNC: 33.4 G/DL (ref 33.7–35.3)
MCV RBC AUTO: 85.2 FL (ref 81.4–97.8)
MONOCYTES # BLD AUTO: 0.69 K/UL (ref 0–0.85)
MONOCYTES NFR BLD AUTO: 8 % (ref 0–13.4)
NEUTROPHILS # BLD AUTO: 5.66 K/UL (ref 1.82–7.42)
NEUTROPHILS NFR BLD: 66.1 % (ref 44–72)
NRBC # BLD AUTO: 0 K/UL
NRBC BLD-RTO: 0 /100 WBC
PLATELET # BLD AUTO: 237 K/UL (ref 164–446)
PMV BLD AUTO: 8.6 FL (ref 9–12.9)
POTASSIUM SERPL-SCNC: 4.1 MMOL/L (ref 3.6–5.5)
PROT SERPL-MCNC: 8.2 G/DL (ref 6–8.2)
RBC # BLD AUTO: 5.76 M/UL (ref 4.7–6.1)
SODIUM SERPL-SCNC: 137 MMOL/L (ref 135–145)
TROPONIN T SERPL-MCNC: <6 NG/L (ref 6–19)
TROPONIN T SERPL-MCNC: <6 NG/L (ref 6–19)
WBC # BLD AUTO: 8.6 K/UL (ref 4.8–10.8)

## 2019-09-20 PROCEDURE — 700111 HCHG RX REV CODE 636 W/ 250 OVERRIDE (IP): Performed by: EMERGENCY MEDICINE

## 2019-09-20 PROCEDURE — 80053 COMPREHEN METABOLIC PANEL: CPT

## 2019-09-20 PROCEDURE — 84484 ASSAY OF TROPONIN QUANT: CPT

## 2019-09-20 PROCEDURE — 85025 COMPLETE CBC W/AUTO DIFF WBC: CPT

## 2019-09-20 PROCEDURE — 93005 ELECTROCARDIOGRAM TRACING: CPT | Performed by: EMERGENCY MEDICINE

## 2019-09-20 PROCEDURE — 36415 COLL VENOUS BLD VENIPUNCTURE: CPT

## 2019-09-20 PROCEDURE — 83690 ASSAY OF LIPASE: CPT

## 2019-09-20 PROCEDURE — 93005 ELECTROCARDIOGRAM TRACING: CPT

## 2019-09-20 PROCEDURE — 700102 HCHG RX REV CODE 250 W/ 637 OVERRIDE(OP): Performed by: EMERGENCY MEDICINE

## 2019-09-20 PROCEDURE — 96374 THER/PROPH/DIAG INJ IV PUSH: CPT

## 2019-09-20 PROCEDURE — 71045 X-RAY EXAM CHEST 1 VIEW: CPT

## 2019-09-20 PROCEDURE — A9270 NON-COVERED ITEM OR SERVICE: HCPCS | Performed by: EMERGENCY MEDICINE

## 2019-09-20 PROCEDURE — 96375 TX/PRO/DX INJ NEW DRUG ADDON: CPT

## 2019-09-20 PROCEDURE — 99285 EMERGENCY DEPT VISIT HI MDM: CPT

## 2019-09-20 RX ORDER — MORPHINE SULFATE 4 MG/ML
4 INJECTION, SOLUTION INTRAMUSCULAR; INTRAVENOUS ONCE
Status: COMPLETED | OUTPATIENT
Start: 2019-09-20 | End: 2019-09-20

## 2019-09-20 RX ORDER — FAMOTIDINE 40 MG/1
40 TABLET, FILM COATED ORAL DAILY
Qty: 60 TAB | Refills: 1 | Status: SHIPPED
Start: 2019-09-20 | End: 2021-03-21

## 2019-09-20 RX ORDER — ONDANSETRON 2 MG/ML
4 INJECTION INTRAMUSCULAR; INTRAVENOUS ONCE
Status: COMPLETED | OUTPATIENT
Start: 2019-09-20 | End: 2019-09-20

## 2019-09-20 RX ADMIN — LIDOCAINE HYDROCHLORIDE 15 ML: 20 SOLUTION OROPHARYNGEAL at 23:01

## 2019-09-20 RX ADMIN — MORPHINE SULFATE 4 MG: 4 INJECTION INTRAVENOUS at 23:01

## 2019-09-20 RX ADMIN — ONDANSETRON 4 MG: 2 INJECTION INTRAMUSCULAR; INTRAVENOUS at 23:04

## 2019-09-21 NOTE — ED PROVIDER NOTES
ED Provider Note    CHIEF COMPLAINT  Chief Complaint   Patient presents with   • Chest Pain     c/o mid sub sternal chest pain x 2 days, non-radiating.        HPI  John Moran is a 49 y.o. male here for evaluation of substernal chest pain.  Patient states that the pain started approximately 2 days ago, and has been intermittent since that time.  There is no radiation of the pain, it is not associate with shortness of breath or nausea, and is not associated with vomiting.  The patient states that he has never had this in the past, and is not taking anything other than Tums yesterday for the same.  This did not alleviate his symptoms, and he states that today he had some increasing pain which is what brought him to the emergency department.  Nothing exacerbates his symptoms, and nothing seems to alleviate them.  He describes as an aching pain to the substernal area.  There is no change with movement or bending forward.    PAST MEDICAL HISTORY   has a past medical history of Subcutaneous mass.    SOCIAL HISTORY  Social History     Tobacco Use   • Smoking status: Never Smoker   • Smokeless tobacco: Never Used   Substance and Sexual Activity   • Alcohol use: No   • Drug use: No   • Sexual activity: Not on file       Family History  No bleeding disorders    SURGICAL HISTORY   has a past surgical history that includes mass excision general (Right, 9/6/2017).    CURRENT MEDICATIONS  Home Medications     Reviewed by Keyona Kong R.N. (Registered Nurse) on 09/20/19 at 1737  Med List Status: Complete   Medication Last Dose Status        Patient Danilo Taking any Medications                       ALLERGIES  No Known Allergies    REVIEW OF SYSTEMS  See HPI for further details. Review of systems as above, otherwise all other systems are negative.     PHYSICAL EXAM  Constitutional: Well developed, well nourished. No acute distress.  HEENT: Normocephalic, atraumatic. Posterior pharynx clear and moist.  Eyes:  EOMI.  Normal sclera.  Neck: Supple, Full range of motion, nontender.  Chest/Pulmonary: clear to ausculation. Symmetrical expansion.    Cardio: Regular rate and rhythm with no murmur.   Abdomen: Soft, epigastric tenderness, No peritoneal signs. No guarding. No palpable masses.  Back: No CVA tenderness, nontender midline, no step offs.  Musculoskeletal: No deformity, no edema, neurovascular intact.   Neuro: Clear speech, appropriate, cooperative, cranial nerves II-XII grossly intact.  Psych: Normal mood and affect    Results for orders placed or performed during the hospital encounter of 09/20/19   CBC with Differential   Result Value Ref Range    WBC 8.6 4.8 - 10.8 K/uL    RBC 5.76 4.70 - 6.10 M/uL    Hemoglobin 16.4 14.0 - 18.0 g/dL    Hematocrit 49.1 42.0 - 52.0 %    MCV 85.2 81.4 - 97.8 fL    MCH 28.5 27.0 - 33.0 pg    MCHC 33.4 (L) 33.7 - 35.3 g/dL    RDW 43.2 35.9 - 50.0 fL    Platelet Count 237 164 - 446 K/uL    MPV 8.6 (L) 9.0 - 12.9 fL    Neutrophils-Polys 66.10 44.00 - 72.00 %    Lymphocytes 22.80 22.00 - 41.00 %    Monocytes 8.00 0.00 - 13.40 %    Eosinophils 2.40 0.00 - 6.90 %    Basophils 0.50 0.00 - 1.80 %    Immature Granulocytes 0.20 0.00 - 0.90 %    Nucleated RBC 0.00 /100 WBC    Neutrophils (Absolute) 5.66 1.82 - 7.42 K/uL    Lymphs (Absolute) 1.96 1.00 - 4.80 K/uL    Monos (Absolute) 0.69 0.00 - 0.85 K/uL    Eos (Absolute) 0.21 0.00 - 0.51 K/uL    Baso (Absolute) 0.04 0.00 - 0.12 K/uL    Immature Granulocytes (abs) 0.02 0.00 - 0.11 K/uL    NRBC (Absolute) 0.00 K/uL   Complete Metabolic Panel (CMP)   Result Value Ref Range    Sodium 137 135 - 145 mmol/L    Potassium 4.1 3.6 - 5.5 mmol/L    Chloride 102 96 - 112 mmol/L    Co2 26 20 - 33 mmol/L    Anion Gap 9.0 0.0 - 11.9    Glucose 95 65 - 99 mg/dL    Bun 16 8 - 22 mg/dL    Creatinine 0.92 0.50 - 1.40 mg/dL    Calcium 9.9 8.5 - 10.5 mg/dL    AST(SGOT) 25 12 - 45 U/L    ALT(SGPT) 32 2 - 50 U/L    Alkaline Phosphatase 98 30 - 99 U/L    Total Bilirubin 0.7 0.1  - 1.5 mg/dL    Albumin 5.0 (H) 3.2 - 4.9 g/dL    Total Protein 8.2 6.0 - 8.2 g/dL    Globulin 3.2 1.9 - 3.5 g/dL    A-G Ratio 1.6 g/dL   Troponin   Result Value Ref Range    Troponin T <6 6 - 19 ng/L   ESTIMATED GFR   Result Value Ref Range    GFR If African American >60 >60 mL/min/1.73 m 2    GFR If Non African American >60 >60 mL/min/1.73 m 2   TROPONIN   Result Value Ref Range    Troponin T <6 6 - 19 ng/L   LIPASE   Result Value Ref Range    Lipase 20 11 - 82 U/L   EKG (NOW)   Result Value Ref Range    Report       [facility] Emergency [facility].    Test Date:  2019  Pt Name:    DIPTI LOCKETT      Department: ER  MRN:        [id number]                      Room:  Gender:     Male                         Technician: [id number]  :        1970                   Requested By:ER TRIAGE PROTOCOL  Order #:    [id number]                    Reading MD:    Measurements  Intervals                                Axis  Rate:       63                           P:          55  CA:         148                          QRS:        76  QRSD:       76                           T:          58  QT:         376  QTc:        385    Interpretive Statements  SINUS RHYTHM  No previous ECG available for comparison       DX-CHEST-PORTABLE (1 VIEW)   Final Result         1.  No acute cardiopulmonary disease.        Ekg;  nsr 63.  No st elevation, no st depression, no ectopy, qtc 385.  No comparison.     PROCEDURES     MEDICAL RECORD  I have reviewed patient's medical record and pertinent results are listed above.    COURSE & MEDICAL DECISION MAKING  I have reviewed any medical record information, laboratory studies and radiographic results as noted above.    11:46 PM  The pt has resolution of his pain.  He currently has no pain, and feels much better after the gi cocktail.  He has two sets of negative trops, a normal ekg, negative cxr, and a heart score of zero.    He will return  Here for any other issues or  concerns.  His pain appears to be from PUD, but he will need GI to follow him for this.  I will place him on an acid blocker.      If you have had any blood pressure issues while here in the emergency department, please see your doctor for a further evaluation or work up.    Differential diagnoses include but not limited to: mi, refulx, pud, pe, aaa, dissection     This patient presents with epigastric pain .  At this time, I have counseled the patient/family regarding their medications, pain control, and follow up.  They will continue their medications, if any, as prescribed.  They will return immediately for any worsening symptoms and/or any other medical concerns.  They will see their doctor, or contact the doctor provided, in 1-2 days for follow up.       FINAL IMPRESSION  Epigastric pain        Electronically signed by: Jah Lemus, 9/20/2019 10:27 PM

## 2019-09-21 NOTE — ED NOTES
Report from Yadira SANCHEZ, assume care.   Pt states mid epigastric pain, 10/10. Pt took Tums without relief, denies N/V.

## 2019-09-21 NOTE — ED TRIAGE NOTES
.John Moran  .  Chief Complaint   Patient presents with   • Chest Pain     c/o mid sub sternal chest pain x 2 days, non-radiating.      Patient to triage with above complaint. EKG completed. ./81   Pulse 82   Temp 36.4 °C (97.5 °F) (Temporal)   Resp 14   Wt 66.3 kg (146 lb 2.6 oz)   SpO2 96%   BMI 25.89 kg/m²     Patient to lobby and instructed to inform staff of any needs.

## 2019-09-21 NOTE — ED NOTES
Report to Clary SANCHEZ  Pt resting in bed.  Daughter interprets for him.  Indicates pain is right at epigastrum.  He felt better when he was standing rather than sitting.  No N/V or diarrhea.

## 2019-09-21 NOTE — ED NOTES
Discharge instructions given to patient, prescriptions provided, a verbal understanding of all instructions was stated. IV removed, cathlon intact, site without s/s of infection. Pt preferred to walk out accompanied by family VSS,  all belongings accounted for.

## 2019-09-23 ENCOUNTER — HOSPITAL ENCOUNTER (EMERGENCY)
Facility: MEDICAL CENTER | Age: 49
End: 2019-10-08
Payer: COMMERCIAL

## 2021-03-21 ENCOUNTER — HOSPITAL ENCOUNTER (OUTPATIENT)
Facility: MEDICAL CENTER | Age: 51
End: 2021-03-21
Attending: NURSE PRACTITIONER
Payer: COMMERCIAL

## 2021-03-21 ENCOUNTER — HOSPITAL ENCOUNTER (INPATIENT)
Facility: MEDICAL CENTER | Age: 51
LOS: 2 days | DRG: 872 | End: 2021-03-23
Attending: EMERGENCY MEDICINE | Admitting: INTERNAL MEDICINE
Payer: COMMERCIAL

## 2021-03-21 ENCOUNTER — TELEPHONE (OUTPATIENT)
Dept: URGENT CARE | Facility: CLINIC | Age: 51
End: 2021-03-21

## 2021-03-21 ENCOUNTER — APPOINTMENT (OUTPATIENT)
Dept: RADIOLOGY | Facility: MEDICAL CENTER | Age: 51
DRG: 872 | End: 2021-03-21
Attending: EMERGENCY MEDICINE
Payer: COMMERCIAL

## 2021-03-21 ENCOUNTER — OFFICE VISIT (OUTPATIENT)
Dept: URGENT CARE | Facility: CLINIC | Age: 51
End: 2021-03-21
Payer: COMMERCIAL

## 2021-03-21 VITALS
RESPIRATION RATE: 16 BRPM | DIASTOLIC BLOOD PRESSURE: 58 MMHG | WEIGHT: 159 LBS | BODY MASS INDEX: 24.96 KG/M2 | SYSTOLIC BLOOD PRESSURE: 102 MMHG | TEMPERATURE: 99.1 F | HEART RATE: 106 BPM | OXYGEN SATURATION: 95 % | HEIGHT: 67 IN

## 2021-03-21 DIAGNOSIS — N39.0 COMPLICATED UTI (URINARY TRACT INFECTION): ICD-10-CM

## 2021-03-21 DIAGNOSIS — N30.90 CYSTITIS: ICD-10-CM

## 2021-03-21 DIAGNOSIS — N39.0 ACUTE UTI: ICD-10-CM

## 2021-03-21 DIAGNOSIS — R30.0 DYSURIA: ICD-10-CM

## 2021-03-21 DIAGNOSIS — R53.81 MALAISE: ICD-10-CM

## 2021-03-21 DIAGNOSIS — A41.9 SEPSIS, DUE TO UNSPECIFIED ORGANISM, UNSPECIFIED WHETHER ACUTE ORGAN DYSFUNCTION PRESENT (HCC): ICD-10-CM

## 2021-03-21 DIAGNOSIS — B99.9 FEVER DUE TO INFECTION: ICD-10-CM

## 2021-03-21 PROBLEM — Q44.1 GALLBLADDER ANOMALY: Status: ACTIVE | Noted: 2021-03-21

## 2021-03-21 PROBLEM — R74.01 TRANSAMINITIS: Status: ACTIVE | Noted: 2021-03-21

## 2021-03-21 LAB
ALBUMIN SERPL BCP-MCNC: 4.4 G/DL (ref 3.2–4.9)
ALBUMIN SERPL BCP-MCNC: 4.5 G/DL (ref 3.2–4.9)
ALBUMIN/GLOB SERPL: 1.3 G/DL
ALBUMIN/GLOB SERPL: 1.4 G/DL
ALP SERPL-CCNC: 130 U/L (ref 30–99)
ALP SERPL-CCNC: 143 U/L (ref 30–99)
ALT SERPL-CCNC: 75 U/L (ref 2–50)
ALT SERPL-CCNC: 79 U/L (ref 2–50)
ANION GAP SERPL CALC-SCNC: 12 MMOL/L (ref 7–16)
ANION GAP SERPL CALC-SCNC: 14 MMOL/L (ref 7–16)
APPEARANCE UR: CLEAR
APPEARANCE UR: NORMAL
AST SERPL-CCNC: 65 U/L (ref 12–45)
AST SERPL-CCNC: 68 U/L (ref 12–45)
BACTERIA #/AREA URNS HPF: ABNORMAL /HPF
BASOPHILS # BLD AUTO: 0.4 % (ref 0–1.8)
BASOPHILS # BLD AUTO: 0.4 % (ref 0–1.8)
BASOPHILS # BLD: 0.04 K/UL (ref 0–0.12)
BASOPHILS # BLD: 0.05 K/UL (ref 0–0.12)
BILIRUB SERPL-MCNC: 1.4 MG/DL (ref 0.1–1.5)
BILIRUB SERPL-MCNC: 1.7 MG/DL (ref 0.1–1.5)
BILIRUB UR STRIP-MCNC: NEGATIVE MG/DL
BUN SERPL-MCNC: 11 MG/DL (ref 8–22)
BUN SERPL-MCNC: 11 MG/DL (ref 8–22)
CALCIUM SERPL-MCNC: 9.3 MG/DL (ref 8.4–10.2)
CALCIUM SERPL-MCNC: 9.5 MG/DL (ref 8.5–10.5)
CHLORIDE SERPL-SCNC: 98 MMOL/L (ref 96–112)
CHLORIDE SERPL-SCNC: 99 MMOL/L (ref 96–112)
CO2 SERPL-SCNC: 23 MMOL/L (ref 20–33)
CO2 SERPL-SCNC: 23 MMOL/L (ref 20–33)
COLOR UR AUTO: NORMAL
COLOR UR: ABNORMAL
COVID ORDER STATUS COVID19: NORMAL
CREAT SERPL-MCNC: 0.94 MG/DL (ref 0.5–1.4)
CREAT SERPL-MCNC: 0.95 MG/DL (ref 0.5–1.4)
EOSINOPHIL # BLD AUTO: 0.01 K/UL (ref 0–0.51)
EOSINOPHIL # BLD AUTO: 0.01 K/UL (ref 0–0.51)
EOSINOPHIL NFR BLD: 0.1 % (ref 0–6.9)
EOSINOPHIL NFR BLD: 0.1 % (ref 0–6.9)
ERYTHROCYTE [DISTWIDTH] IN BLOOD BY AUTOMATED COUNT: 43 FL (ref 35.9–50)
ERYTHROCYTE [DISTWIDTH] IN BLOOD BY AUTOMATED COUNT: 43.8 FL (ref 35.9–50)
GLOBULIN SER CALC-MCNC: 3.2 G/DL (ref 1.9–3.5)
GLOBULIN SER CALC-MCNC: 3.3 G/DL (ref 1.9–3.5)
GLUCOSE SERPL-MCNC: 132 MG/DL (ref 65–99)
GLUCOSE SERPL-MCNC: 142 MG/DL (ref 65–99)
GLUCOSE UR STRIP.AUTO-MCNC: NEGATIVE MG/DL
HCT VFR BLD AUTO: 44 % (ref 42–52)
HCT VFR BLD AUTO: 46.2 % (ref 42–52)
HGB BLD-MCNC: 14.6 G/DL (ref 14–18)
HGB BLD-MCNC: 15.4 G/DL (ref 14–18)
IMM GRANULOCYTES # BLD AUTO: 0.05 K/UL (ref 0–0.11)
IMM GRANULOCYTES # BLD AUTO: 0.05 K/UL (ref 0–0.11)
IMM GRANULOCYTES NFR BLD AUTO: 0.4 % (ref 0–0.9)
IMM GRANULOCYTES NFR BLD AUTO: 0.4 % (ref 0–0.9)
INR PPP: 1.22 (ref 0.87–1.13)
KETONES UR STRIP.AUTO-MCNC: NEGATIVE MG/DL
LACTATE BLD-SCNC: 1.7 MMOL/L (ref 0.5–2)
LACTATE BLD-SCNC: 2.1 MMOL/L (ref 0.5–2)
LEUKOCYTE ESTERASE UR QL STRIP.AUTO: NORMAL
LIPASE SERPL-CCNC: 18 U/L (ref 7–58)
LYMPHOCYTES # BLD AUTO: 0.75 K/UL (ref 1–4.8)
LYMPHOCYTES # BLD AUTO: 1.41 K/UL (ref 1–4.8)
LYMPHOCYTES NFR BLD: 10.7 % (ref 22–41)
LYMPHOCYTES NFR BLD: 6.6 % (ref 22–41)
MCH RBC QN AUTO: 28.6 PG (ref 27–33)
MCH RBC QN AUTO: 28.7 PG (ref 27–33)
MCHC RBC AUTO-ENTMCNC: 33.2 G/DL (ref 33.7–35.3)
MCHC RBC AUTO-ENTMCNC: 33.3 G/DL (ref 33.7–35.3)
MCV RBC AUTO: 86 FL (ref 81.4–97.8)
MCV RBC AUTO: 86.1 FL (ref 81.4–97.8)
MICRO URNS: ABNORMAL
MONOCYTES # BLD AUTO: 0.91 K/UL (ref 0–0.85)
MONOCYTES # BLD AUTO: 1.02 K/UL (ref 0–0.85)
MONOCYTES NFR BLD AUTO: 6.9 % (ref 0–13.4)
MONOCYTES NFR BLD AUTO: 9 % (ref 0–13.4)
NEUTROPHILS # BLD AUTO: 10.78 K/UL (ref 1.82–7.42)
NEUTROPHILS # BLD AUTO: 9.47 K/UL (ref 1.82–7.42)
NEUTROPHILS NFR BLD: 81.5 % (ref 44–72)
NEUTROPHILS NFR BLD: 83.5 % (ref 44–72)
NITRITE UR QL STRIP.AUTO: NEGATIVE
NRBC # BLD AUTO: 0 K/UL
NRBC # BLD AUTO: 0 K/UL
NRBC BLD-RTO: 0 /100 WBC
NRBC BLD-RTO: 0 /100 WBC
PH UR STRIP.AUTO: 7 [PH] (ref 5–8)
PH UR STRIP.AUTO: ABNORMAL [PH] (ref 5–8)
PLATELET # BLD AUTO: 178 K/UL (ref 164–446)
PLATELET # BLD AUTO: 206 K/UL (ref 164–446)
PMV BLD AUTO: 8.7 FL (ref 9–12.9)
PMV BLD AUTO: 9.3 FL (ref 9–12.9)
POTASSIUM SERPL-SCNC: 4 MMOL/L (ref 3.6–5.5)
POTASSIUM SERPL-SCNC: 4.3 MMOL/L (ref 3.6–5.5)
PROT SERPL-MCNC: 7.7 G/DL (ref 6–8.2)
PROT SERPL-MCNC: 7.7 G/DL (ref 6–8.2)
PROT UR QL STRIP: 30 MG/DL
PROTHROMBIN TIME: 15.1 SEC (ref 12–14.6)
RBC # BLD AUTO: 5.11 M/UL (ref 4.7–6.1)
RBC # BLD AUTO: 5.37 M/UL (ref 4.7–6.1)
RBC # URNS HPF: ABNORMAL /HPF
RBC UR QL AUTO: NORMAL
SARS-COV-2 RNA RESP QL NAA+PROBE: NOTDETECTED
SODIUM SERPL-SCNC: 133 MMOL/L (ref 135–145)
SODIUM SERPL-SCNC: 136 MMOL/L (ref 135–145)
SP GR UR STRIP.AUTO: 1.01
SP GR UR STRIP.AUTO: 1.01
SPECIMEN SOURCE: NORMAL
TRANS CELLS URNS QL MICRO: ABNORMAL /HPF
UROBILINOGEN UR STRIP-MCNC: 1 MG/DL
WBC # BLD AUTO: 11.3 K/UL (ref 4.8–10.8)
WBC # BLD AUTO: 13.2 K/UL (ref 4.8–10.8)
WBC #/AREA URNS HPF: ABNORMAL /HPF

## 2021-03-21 PROCEDURE — 71045 X-RAY EXAM CHEST 1 VIEW: CPT

## 2021-03-21 PROCEDURE — 85610 PROTHROMBIN TIME: CPT

## 2021-03-21 PROCEDURE — 83605 ASSAY OF LACTIC ACID: CPT

## 2021-03-21 PROCEDURE — U0005 INFEC AGEN DETEC AMPLI PROBE: HCPCS

## 2021-03-21 PROCEDURE — 87086 URINE CULTURE/COLONY COUNT: CPT | Mod: 91

## 2021-03-21 PROCEDURE — 85025 COMPLETE CBC W/AUTO DIFF WBC: CPT | Mod: 91

## 2021-03-21 PROCEDURE — 76705 ECHO EXAM OF ABDOMEN: CPT

## 2021-03-21 PROCEDURE — 99285 EMERGENCY DEPT VISIT HI MDM: CPT

## 2021-03-21 PROCEDURE — 83690 ASSAY OF LIPASE: CPT

## 2021-03-21 PROCEDURE — 96365 THER/PROPH/DIAG IV INF INIT: CPT

## 2021-03-21 PROCEDURE — 36415 COLL VENOUS BLD VENIPUNCTURE: CPT

## 2021-03-21 PROCEDURE — 87077 CULTURE AEROBIC IDENTIFY: CPT | Mod: 91

## 2021-03-21 PROCEDURE — 81001 URINALYSIS AUTO W/SCOPE: CPT

## 2021-03-21 PROCEDURE — 87040 BLOOD CULTURE FOR BACTERIA: CPT | Mod: 91

## 2021-03-21 PROCEDURE — 80053 COMPREHEN METABOLIC PANEL: CPT

## 2021-03-21 PROCEDURE — U0005 INFEC AGEN DETEC AMPLI PROBE: HCPCS | Mod: 91

## 2021-03-21 PROCEDURE — 99214 OFFICE O/P EST MOD 30 MIN: CPT | Performed by: NURSE PRACTITIONER

## 2021-03-21 PROCEDURE — U0003 INFECTIOUS AGENT DETECTION BY NUCLEIC ACID (DNA OR RNA); SEVERE ACUTE RESPIRATORY SYNDROME CORONAVIRUS 2 (SARS-COV-2) (CORONAVIRUS DISEASE [COVID-19]), AMPLIFIED PROBE TECHNIQUE, MAKING USE OF HIGH THROUGHPUT TECHNOLOGIES AS DESCRIBED BY CMS-2020-01-R: HCPCS

## 2021-03-21 PROCEDURE — 87186 SC STD MICRODIL/AGAR DIL: CPT

## 2021-03-21 PROCEDURE — 87086 URINE CULTURE/COLONY COUNT: CPT

## 2021-03-21 PROCEDURE — A9270 NON-COVERED ITEM OR SERVICE: HCPCS | Performed by: EMERGENCY MEDICINE

## 2021-03-21 PROCEDURE — 85025 COMPLETE CBC W/AUTO DIFF WBC: CPT

## 2021-03-21 PROCEDURE — 700105 HCHG RX REV CODE 258: Performed by: EMERGENCY MEDICINE

## 2021-03-21 PROCEDURE — 80053 COMPREHEN METABOLIC PANEL: CPT | Mod: 91

## 2021-03-21 PROCEDURE — 99223 1ST HOSP IP/OBS HIGH 75: CPT | Performed by: INTERNAL MEDICINE

## 2021-03-21 PROCEDURE — 81002 URINALYSIS NONAUTO W/O SCOPE: CPT | Performed by: NURSE PRACTITIONER

## 2021-03-21 PROCEDURE — 700102 HCHG RX REV CODE 250 W/ 637 OVERRIDE(OP): Performed by: EMERGENCY MEDICINE

## 2021-03-21 PROCEDURE — 700111 HCHG RX REV CODE 636 W/ 250 OVERRIDE (IP): Performed by: EMERGENCY MEDICINE

## 2021-03-21 PROCEDURE — U0003 INFECTIOUS AGENT DETECTION BY NUCLEIC ACID (DNA OR RNA); SEVERE ACUTE RESPIRATORY SYNDROME CORONAVIRUS 2 (SARS-COV-2) (CORONAVIRUS DISEASE [COVID-19]), AMPLIFIED PROBE TECHNIQUE, MAKING USE OF HIGH THROUGHPUT TECHNOLOGIES AS DESCRIBED BY CMS-2020-01-R: HCPCS | Mod: 91

## 2021-03-21 PROCEDURE — 770006 HCHG ROOM/CARE - MED/SURG/GYN SEMI*

## 2021-03-21 RX ORDER — ACETAMINOPHEN 325 MG/1
650 TABLET ORAL EVERY 6 HOURS PRN
Status: DISCONTINUED | OUTPATIENT
Start: 2021-03-21 | End: 2021-03-23 | Stop reason: HOSPADM

## 2021-03-21 RX ORDER — ACETAMINOPHEN 325 MG/1
650 TABLET ORAL ONCE
Status: COMPLETED | OUTPATIENT
Start: 2021-03-21 | End: 2021-03-21

## 2021-03-21 RX ORDER — SULFAMETHOXAZOLE AND TRIMETHOPRIM 800; 160 MG/1; MG/1
1 TABLET ORAL 2 TIMES DAILY
Qty: 20 TABLET | Refills: 0 | Status: ON HOLD | OUTPATIENT
Start: 2021-03-21 | End: 2021-03-23

## 2021-03-21 RX ORDER — ONDANSETRON 4 MG/1
4 TABLET, ORALLY DISINTEGRATING ORAL EVERY 4 HOURS PRN
Status: DISCONTINUED | OUTPATIENT
Start: 2021-03-21 | End: 2021-03-23 | Stop reason: HOSPADM

## 2021-03-21 RX ORDER — ONDANSETRON 2 MG/ML
4 INJECTION INTRAMUSCULAR; INTRAVENOUS EVERY 4 HOURS PRN
Status: DISCONTINUED | OUTPATIENT
Start: 2021-03-21 | End: 2021-03-23 | Stop reason: HOSPADM

## 2021-03-21 RX ORDER — SULFAMETHOXAZOLE AND TRIMETHOPRIM 800; 160 MG/1; MG/1
1 TABLET ORAL 2 TIMES DAILY
Qty: 20 TABLET | Refills: 0 | Status: SHIPPED
Start: 2021-03-21 | End: 2021-03-21 | Stop reason: CLARIF

## 2021-03-21 RX ORDER — BISACODYL 10 MG
10 SUPPOSITORY, RECTAL RECTAL
Status: DISCONTINUED | OUTPATIENT
Start: 2021-03-21 | End: 2021-03-23 | Stop reason: HOSPADM

## 2021-03-21 RX ORDER — AMOXICILLIN 250 MG
2 CAPSULE ORAL 2 TIMES DAILY PRN
Status: DISCONTINUED | OUTPATIENT
Start: 2021-03-21 | End: 2021-03-23 | Stop reason: HOSPADM

## 2021-03-21 RX ORDER — POLYETHYLENE GLYCOL 3350 17 G/17G
1 POWDER, FOR SOLUTION ORAL
Status: DISCONTINUED | OUTPATIENT
Start: 2021-03-21 | End: 2021-03-23 | Stop reason: HOSPADM

## 2021-03-21 RX ORDER — SODIUM CHLORIDE, SODIUM LACTATE, POTASSIUM CHLORIDE, AND CALCIUM CHLORIDE .6; .31; .03; .02 G/100ML; G/100ML; G/100ML; G/100ML
30 INJECTION, SOLUTION INTRAVENOUS ONCE
Status: COMPLETED | OUTPATIENT
Start: 2021-03-21 | End: 2021-03-22

## 2021-03-21 RX ORDER — SODIUM CHLORIDE 9 MG/ML
INJECTION, SOLUTION INTRAVENOUS CONTINUOUS
Status: ACTIVE | OUTPATIENT
Start: 2021-03-21 | End: 2021-03-22

## 2021-03-21 RX ORDER — IBUPROFEN 600 MG/1
600 TABLET ORAL ONCE
Status: COMPLETED | OUTPATIENT
Start: 2021-03-21 | End: 2021-03-21

## 2021-03-21 RX ADMIN — CEFTRIAXONE SODIUM 1 G: 1 INJECTION, POWDER, FOR SOLUTION INTRAMUSCULAR; INTRAVENOUS at 20:26

## 2021-03-21 RX ADMIN — ACETAMINOPHEN 650 MG: 325 TABLET, FILM COATED ORAL at 20:36

## 2021-03-21 RX ADMIN — IBUPROFEN 600 MG: 600 TABLET, FILM COATED ORAL at 20:36

## 2021-03-21 RX ADMIN — SODIUM CHLORIDE, POTASSIUM CHLORIDE, SODIUM LACTATE AND CALCIUM CHLORIDE 2142 ML: 600; 310; 30; 20 INJECTION, SOLUTION INTRAVENOUS at 20:26

## 2021-03-21 ASSESSMENT — FIBROSIS 4 INDEX
FIB4 SCORE: 0.97
FIB4 SCORE: 1.98

## 2021-03-21 ASSESSMENT — PATIENT HEALTH QUESTIONNAIRE - PHQ9
2. FEELING DOWN, DEPRESSED, IRRITABLE, OR HOPELESS: NOT AT ALL
1. LITTLE INTEREST OR PLEASURE IN DOING THINGS: NOT AT ALL
SUM OF ALL RESPONSES TO PHQ9 QUESTIONS 1 AND 2: 0

## 2021-03-21 ASSESSMENT — ENCOUNTER SYMPTOMS
FEVER: 1
FLANK PAIN: 0
SHORTNESS OF BREATH: 0
HEADACHES: 0
FLANK PAIN: 0
CHILLS: 1
NAUSEA: 0
VOMITING: 0
FEVER: 1
ABDOMINAL PAIN: 0
CHILLS: 1
DIZZINESS: 0
MYALGIAS: 0
DEPRESSION: 0

## 2021-03-21 NOTE — PROGRESS NOTES
Subjective:      John Moran is a 51 y.o. male who presents with Painful Urination (chills, painful urination, unable to urinate, fever on and off, urgency x 1 day)    Past Medical History:   Diagnosis Date   • Subcutaneous mass     right scapula     Social History     Socioeconomic History   • Marital status:      Spouse name: Not on file   • Number of children: Not on file   • Years of education: Not on file   • Highest education level: Not on file   Occupational History   • Not on file   Tobacco Use   • Smoking status: Never Smoker   • Smokeless tobacco: Never Used   Substance and Sexual Activity   • Alcohol use: No   • Drug use: No   • Sexual activity: Yes   Other Topics Concern   • Not on file   Social History Narrative   • Not on file     Social Determinants of Health     Financial Resource Strain:    • Difficulty of Paying Living Expenses:    Food Insecurity:    • Worried About Running Out of Food in the Last Year:    • Ran Out of Food in the Last Year:    Transportation Needs:    • Lack of Transportation (Medical):    • Lack of Transportation (Non-Medical):    Physical Activity:    • Days of Exercise per Week:    • Minutes of Exercise per Session:    Stress:    • Feeling of Stress :    Social Connections:    • Frequency of Communication with Friends and Family:    • Frequency of Social Gatherings with Friends and Family:    • Attends Gn[employer] Services:    • Active Member of Clubs or Organizations:    • Attends Club or Organization Meetings:    • Marital Status:    Intimate Partner Violence:    • Fear of Current or Ex-Partner:    • Emotionally Abused:    • Physically Abused:    • Sexually Abused:      History reviewed. No pertinent family history.    Allergies: Patient has no known allergies.            Dysuria   This is a new problem. The current episode started in the past 7 days. The problem occurs every urination. The quality of the pain is described as aching and burning. The pain is  "moderate. Associated symptoms include chills, frequency and urgency. Pertinent negatives include no flank pain or hematuria. He has tried nothing for the symptoms. The treatment provided no relief.       Review of Systems   Constitutional: Positive for chills, fever and malaise/fatigue.   Genitourinary: Positive for dysuria, frequency and urgency. Negative for flank pain and hematuria.   All other systems reviewed and are negative.         Objective:     /58 (BP Location: Right arm, Patient Position: Sitting, BP Cuff Size: Adult long)   Pulse (!) 106   Temp 37.2 °C (98.9 °F) (Temporal)   Resp 16   Ht 1.702 m (5' 7\")   Wt 72.1 kg (159 lb)   SpO2 95%   BMI 24.90 kg/m²      Physical Exam  Vitals reviewed.   Constitutional:       Appearance: Normal appearance.   HENT:      Head: Normocephalic and atraumatic.   Cardiovascular:      Rate and Rhythm: Normal rate and regular rhythm.   Pulmonary:      Effort: Pulmonary effort is normal.      Breath sounds: Normal breath sounds.   Abdominal:      General: Abdomen is flat.      Tenderness: There is abdominal tenderness. There is no right CVA tenderness, left CVA tenderness, guarding or rebound.   Skin:     General: Skin is warm and dry.   Neurological:      Mental Status: He is alert and oriented to person, place, and time.   Psychiatric:         Mood and Affect: Mood normal.         Behavior: Behavior normal.         Thought Content: Thought content normal.         Judgment: Judgment normal.       UA: positive leukocytes, positive blood    Oral temperature: 99.1     Patient requests to be tested for Covid as his employer requires it.      Assessment/Plan:        1. Dysuria  2. Complicated UTI  3. Fever due to infection  4. Malaise  5. Cystitis    UA  Urine culture  Labs ordered: CBC, CMP  Rocephin IM  Bactrim DS  Strict ER precatuions: fever >101, persistent symptoms, developing flank pain    "

## 2021-03-21 NOTE — TELEPHONE ENCOUNTER
Patient notified by phone of lab results.  White blood cell count is elevated at 11.3 with absolute neutrophil count of 9.47 which is elevated.  On patient's comprehensive metabolic panel sodium is slightly decreased at 133, however liver transaminases are universally elevated.  At this time, I believe the patient requires a higher level of care than we can reasonably and safely provide in the urgent care and I have asked him to proceed to ER for further evaluation at this time.  Patient verbalized understanding and agreement with plan of care, states he will proceed to [facility]own ER at [facility].

## 2021-03-22 LAB
ALBUMIN SERPL BCP-MCNC: 3.2 G/DL (ref 3.2–4.9)
ALBUMIN/GLOB SERPL: 1.2 G/DL
ALP SERPL-CCNC: 119 U/L (ref 30–99)
ALT SERPL-CCNC: 54 U/L (ref 2–50)
ANION GAP SERPL CALC-SCNC: 8 MMOL/L (ref 7–16)
AST SERPL-CCNC: 40 U/L (ref 12–45)
BASOPHILS # BLD AUTO: 0.3 % (ref 0–1.8)
BASOPHILS # BLD: 0.03 K/UL (ref 0–0.12)
BILIRUB SERPL-MCNC: 1.5 MG/DL (ref 0.1–1.5)
BUN SERPL-MCNC: 11 MG/DL (ref 8–22)
CALCIUM SERPL-MCNC: 8.3 MG/DL (ref 8.4–10.2)
CHLORIDE SERPL-SCNC: 103 MMOL/L (ref 96–112)
CO2 SERPL-SCNC: 24 MMOL/L (ref 20–33)
CREAT SERPL-MCNC: 0.94 MG/DL (ref 0.5–1.4)
EOSINOPHIL # BLD AUTO: 0.09 K/UL (ref 0–0.51)
EOSINOPHIL NFR BLD: 1 % (ref 0–6.9)
ERYTHROCYTE [DISTWIDTH] IN BLOOD BY AUTOMATED COUNT: 43.9 FL (ref 35.9–50)
GLOBULIN SER CALC-MCNC: 2.7 G/DL (ref 1.9–3.5)
GLUCOSE SERPL-MCNC: 139 MG/DL (ref 65–99)
HCT VFR BLD AUTO: 40.2 % (ref 42–52)
HGB BLD-MCNC: 13.1 G/DL (ref 14–18)
IMM GRANULOCYTES # BLD AUTO: 0.04 K/UL (ref 0–0.11)
IMM GRANULOCYTES NFR BLD AUTO: 0.4 % (ref 0–0.9)
LACTATE BLD-SCNC: 1.1 MMOL/L (ref 0.5–2)
LYMPHOCYTES # BLD AUTO: 1.23 K/UL (ref 1–4.8)
LYMPHOCYTES NFR BLD: 13.4 % (ref 22–41)
MCH RBC QN AUTO: 28.3 PG (ref 27–33)
MCHC RBC AUTO-ENTMCNC: 32.6 G/DL (ref 33.7–35.3)
MCV RBC AUTO: 86.8 FL (ref 81.4–97.8)
MONOCYTES # BLD AUTO: 0.72 K/UL (ref 0–0.85)
MONOCYTES NFR BLD AUTO: 7.8 % (ref 0–13.4)
NEUTROPHILS # BLD AUTO: 7.1 K/UL (ref 1.82–7.42)
NEUTROPHILS NFR BLD: 77.1 % (ref 44–72)
NRBC # BLD AUTO: 0 K/UL
NRBC BLD-RTO: 0 /100 WBC
PLATELET # BLD AUTO: 141 K/UL (ref 164–446)
PMV BLD AUTO: 9.2 FL (ref 9–12.9)
POTASSIUM SERPL-SCNC: 3.8 MMOL/L (ref 3.6–5.5)
PROT SERPL-MCNC: 5.9 G/DL (ref 6–8.2)
RBC # BLD AUTO: 4.63 M/UL (ref 4.7–6.1)
SARS-COV-2 RNA RESP QL NAA+PROBE: NOTDETECTED
SODIUM SERPL-SCNC: 135 MMOL/L (ref 135–145)
SPECIMEN SOURCE: NORMAL
WBC # BLD AUTO: 9.2 K/UL (ref 4.8–10.8)

## 2021-03-22 PROCEDURE — 700105 HCHG RX REV CODE 258: Performed by: INTERNAL MEDICINE

## 2021-03-22 PROCEDURE — 700102 HCHG RX REV CODE 250 W/ 637 OVERRIDE(OP): Performed by: INTERNAL MEDICINE

## 2021-03-22 PROCEDURE — 36415 COLL VENOUS BLD VENIPUNCTURE: CPT

## 2021-03-22 PROCEDURE — 770006 HCHG ROOM/CARE - MED/SURG/GYN SEMI*

## 2021-03-22 PROCEDURE — 83605 ASSAY OF LACTIC ACID: CPT

## 2021-03-22 PROCEDURE — 80053 COMPREHEN METABOLIC PANEL: CPT

## 2021-03-22 PROCEDURE — 700111 HCHG RX REV CODE 636 W/ 250 OVERRIDE (IP): Performed by: INTERNAL MEDICINE

## 2021-03-22 PROCEDURE — 85025 COMPLETE CBC W/AUTO DIFF WBC: CPT

## 2021-03-22 PROCEDURE — A9270 NON-COVERED ITEM OR SERVICE: HCPCS | Performed by: INTERNAL MEDICINE

## 2021-03-22 PROCEDURE — 99232 SBSQ HOSP IP/OBS MODERATE 35: CPT | Performed by: HOSPITALIST

## 2021-03-22 RX ADMIN — ACETAMINOPHEN 650 MG: 325 TABLET, FILM COATED ORAL at 14:49

## 2021-03-22 RX ADMIN — ENOXAPARIN SODIUM 40 MG: 40 INJECTION SUBCUTANEOUS at 05:51

## 2021-03-22 RX ADMIN — SODIUM CHLORIDE: 9 INJECTION, SOLUTION INTRAVENOUS at 14:09

## 2021-03-22 RX ADMIN — ACETAMINOPHEN 650 MG: 325 TABLET, FILM COATED ORAL at 23:26

## 2021-03-22 RX ADMIN — CEFTRIAXONE SODIUM 1 G: 1 INJECTION, POWDER, FOR SOLUTION INTRAMUSCULAR; INTRAVENOUS at 05:52

## 2021-03-22 RX ADMIN — SODIUM CHLORIDE: 9 INJECTION, SOLUTION INTRAVENOUS at 00:00

## 2021-03-22 RX ADMIN — SODIUM CHLORIDE: 9 INJECTION, SOLUTION INTRAVENOUS at 05:51

## 2021-03-22 ASSESSMENT — LIFESTYLE VARIABLES
TOTAL SCORE: 0
EVER HAD A DRINK FIRST THING IN THE MORNING TO STEADY YOUR NERVES TO GET RID OF A HANGOVER: NO
HOW MANY TIMES IN THE PAST YEAR HAVE YOU HAD 5 OR MORE DRINKS IN A DAY: 0
HAVE YOU EVER FELT YOU SHOULD CUT DOWN ON YOUR DRINKING: NO
EVER FELT BAD OR GUILTY ABOUT YOUR DRINKING: NO
AVERAGE NUMBER OF DAYS PER WEEK YOU HAVE A DRINK CONTAINING ALCOHOL: 0
HAVE PEOPLE ANNOYED YOU BY CRITICIZING YOUR DRINKING: NO
ON A TYPICAL DAY WHEN YOU DRINK ALCOHOL HOW MANY DRINKS DO YOU HAVE: 0
TOTAL SCORE: 0
CONSUMPTION TOTAL: NEGATIVE
TOTAL SCORE: 0
ALCOHOL_USE: NO

## 2021-03-22 ASSESSMENT — ENCOUNTER SYMPTOMS
CARDIOVASCULAR NEGATIVE: 1
ABDOMINAL PAIN: 0
SORE THROAT: 0
STRIDOR: 0
NECK PAIN: 0
GASTROINTESTINAL NEGATIVE: 1
RESPIRATORY NEGATIVE: 1
DEPRESSION: 0
EYE REDNESS: 0
FEVER: 0
BRUISES/BLEEDS EASILY: 0
FEVER: 1
SHORTNESS OF BREATH: 0
WEAKNESS: 0
CONSTITUTIONAL NEGATIVE: 1
MYALGIAS: 0
BLOOD IN STOOL: 0
HEADACHES: 0
HALLUCINATIONS: 0
EYE PAIN: 0
VOMITING: 0
BACK PAIN: 0
EYES NEGATIVE: 1
FOCAL WEAKNESS: 0
CHILLS: 1
FLANK PAIN: 0
SEIZURES: 0

## 2021-03-22 ASSESSMENT — COGNITIVE AND FUNCTIONAL STATUS - GENERAL
SUGGESTED CMS G CODE MODIFIER DAILY ACTIVITY: CH
MOBILITY SCORE: 24
DAILY ACTIVITIY SCORE: 24
SUGGESTED CMS G CODE MODIFIER MOBILITY: CH

## 2021-03-22 NOTE — ED PROVIDER NOTES
ED Provider Note    CHIEF COMPLAINT  Chief Complaint   Patient presents with   • Abnormal Labs     Patient report of increase ANC lab results [facility] and sent here for further evaluation.   • Painful Urination     Painful with urination and hesitancy with a fever for two days so he went to the Urgent Care for treatment   • Fever       HPI  HPI    51 y.o. M p/w CC of fever and painful urination.   Sx present for 48 hours.  No hx of prior.   No blood in urine.   No flank pain or TTP.   No hx of medical problem w/ daily meds.   No recent trauma.   Patient denies any vomiting or diarrhea.  Patient denies any history of kidney stones or hematuria.        REVIEW OF SYSTEMS  Review of Systems   Constitutional: Negative.  Negative for fever.   HENT: Negative.  Negative for ear pain and sore throat.    Eyes: Negative.  Negative for pain.   Respiratory: Negative.  Negative for shortness of breath.    Cardiovascular: Negative.  Negative for chest pain.   Gastrointestinal: Negative.  Negative for abdominal pain and blood in stool.   Genitourinary: Positive for frequency and urgency. Negative for dysuria and flank pain.   Musculoskeletal: Negative for back pain, myalgias and neck pain.   Skin: Negative.  Negative for rash.   Neurological: Negative for focal weakness, seizures, weakness and headaches.   Endo/Heme/Allergies: Does not bruise/bleed easily.   Psychiatric/Behavioral: Negative for hallucinations and suicidal ideas.   All other systems reviewed and are negative.      PAST MEDICAL HISTORY   has a past medical history of Subcutaneous mass.    SOCIAL HISTORY  Social History     Tobacco Use   • Smoking status: Former Smoker     Types: Cigarettes     Quit date: 2004     Years since quittin.2   • Smokeless tobacco: Never Used   Substance and Sexual Activity   • Alcohol use: No   • Drug use: No   • Sexual activity: Yes       SURGICAL HISTORY   has a past surgical history that includes  "mass excision general (Right, 9/6/2017).    CURRENT MEDICATIONS  Home Medications     Reviewed by Karen Hastings R.N. (Registered Nurse) on 03/21/21 at 1915  Med List Status: Complete   Medication Last Dose Status   sulfamethoxazole-trimethoprim (BACTRIM DS) 800-160 MG tablet 3/21/2021 Active                ALLERGIES  No Known Allergies    PHYSICAL EXAM  VITAL SIGNS: BP (!) 96/58   Pulse 85   Temp 36.7 °C (98.1 °F) (Oral)   Resp 18   Ht 1.6 m (5' 3\")   Wt 71.4 kg (157 lb 6.5 oz)   SpO2 98%   BMI 27.88 kg/m²  @CHIKIS[[id number]::@  Pulse ox interpretation: I interpret this pulse ox as normal.    Physical Exam   Constitutional: He is oriented to person, place, and time and well-developed, well-nourished, and in no distress.   HENT:   Head: Normocephalic.   Right Ear: External ear normal.   Left Ear: External ear normal.   Mouth/Throat: No oropharyngeal exudate.   Eyes: Pupils are equal, round, and reactive to light. EOM are normal. No scleral icterus.   Cardiovascular: Normal rate.   Pulmonary/Chest: Effort normal. No respiratory distress.   Abdominal: He exhibits no distension. There is no abdominal tenderness.   Musculoskeletal:         General: No deformity. Normal range of motion.      Cervical back: Normal range of motion.   Neurological: He is alert and oriented to person, place, and time. Coordination normal.   Skin: Skin is warm and dry. No rash noted. No erythema.   Psychiatric: Affect and judgment normal.   Nursing note and vitals reviewed.      DIAGNOSTIC STUDIES / PROCEDURES    LABS/EKG  Results for orders placed or performed during the hospital encounter of 03/21/21   Lactic acid (lactate): Repeat if initial lactic acid result is greater than 2   Result Value Ref Range    Lactic Acid 1.7 0.5 - 2.0 mmol/L   CBC WITH DIFFERENTIAL   Result Value Ref Range    WBC 13.2 (H) 4.8 - 10.8 K/uL    RBC 5.11 4.70 - 6.10 M/uL    Hemoglobin 14.6 14.0 - 18.0 g/dL    Hematocrit 44.0 42.0 - 52.0 %    MCV 86.1 81.4 - 97.8 " fL    MCH 28.6 27.0 - 33.0 pg    MCHC 33.2 (L) 33.7 - 35.3 g/dL    RDW 43.0 35.9 - 50.0 fL    Platelet Count 178 164 - 446 K/uL    MPV 8.7 (L) 9.0 - 12.9 fL    Neutrophils-Polys 81.50 (H) 44.00 - 72.00 %    Lymphocytes 10.70 (L) 22.00 - 41.00 %    Monocytes 6.90 0.00 - 13.40 %    Eosinophils 0.10 0.00 - 6.90 %    Basophils 0.40 0.00 - 1.80 %    Immature Granulocytes 0.40 0.00 - 0.90 %    Nucleated RBC 0.00 /100 WBC    Neutrophils (Absolute) 10.78 (H) 1.82 - 7.42 K/uL    Lymphs (Absolute) 1.41 1.00 - 4.80 K/uL    Monos (Absolute) 0.91 (H) 0.00 - 0.85 K/uL    Eos (Absolute) 0.01 0.00 - 0.51 K/uL    Baso (Absolute) 0.05 0.00 - 0.12 K/uL    Immature Granulocytes (abs) 0.05 0.00 - 0.11 K/uL    NRBC (Absolute) 0.00 K/uL   COMP METABOLIC PANEL   Result Value Ref Range    Sodium 136 135 - 145 mmol/L    Potassium 4.0 3.6 - 5.5 mmol/L    Chloride 99 96 - 112 mmol/L    Co2 23 20 - 33 mmol/L    Anion Gap 14.0 7.0 - 16.0    Glucose 142 (H) 65 - 99 mg/dL    Bun 11 8 - 22 mg/dL    Creatinine 0.95 0.50 - 1.40 mg/dL    Calcium 9.3 8.4 - 10.2 mg/dL    AST(SGOT) 65 (H) 12 - 45 U/L    ALT(SGPT) 79 (H) 2 - 50 U/L    Alkaline Phosphatase 143 (H) 30 - 99 U/L    Total Bilirubin 1.4 0.1 - 1.5 mg/dL    Albumin 4.4 3.2 - 4.9 g/dL    Total Protein 7.7 6.0 - 8.2 g/dL    Globulin 3.3 1.9 - 3.5 g/dL    A-G Ratio 1.3 g/dL   URINALYSIS    Specimen: Blood   Result Value Ref Range    Color Orange (A)     Character Clear     Specific Gravity 1.010 <1.035    Ph see comment 5.0 - 8.0    Micro Urine Req Microscopic    LACTIC ACID   Result Value Ref Range    Lactic Acid 2.1 (H) 0.5 - 2.0 mmol/L   Prothrombin Time   Result Value Ref Range    PT 15.1 (H) 12.0 - 14.6 sec    INR 1.22 (H) 0.87 - 1.13   ESTIMATED GFR   Result Value Ref Range    GFR If African American >60 >60 mL/min/1.73 m 2    GFR If Non African American >60 >60 mL/min/1.73 m 2   URINE MICROSCOPIC (W/UA)   Result Value Ref Range    WBC Packed (A) /hpf    RBC 20-50 (A) /hpf    Bacteria Moderate  (A) None /hpf    Trans Epithelial Cells Few /hpf   LIPASE   Result Value Ref Range    Lipase 18 7 - 58 U/L   SARS-CoV-2 PCR (24 hour In-House): Collect NP swab in VTM    Specimen: Respirate   Result Value Ref Range    SARS-CoV-2 Source NP Swab        RADIOLOGY  US-RUQ   Final Result      6 x 7.4 x 7.3 mm echogenic focus within the gallbladder may represent a polyp or an adherent sludge ball. Follow-up ultrasound is recommended.      Mild heterogeneous and echogenic appearance of the liver can be seen in hepatic steatosis or hepatocellular disease.      Limited evaluation of the pancreas which is obscured by overlying bowel gas      DX-CHEST-PORTABLE (1 VIEW)   Final Result         No acute cardiac or pulmonary abnormality is identified.           COURSE & MEDICAL DECISION MAKING  Pertinent Labs & Imaging studies reviewed by me. (See chart for details)  I verified that the patient was wearing a mask and I was wearing appropriate PPE every time I entered the room. The patient's mask was on the patient at all times during my encounter except for a brief view of the oropharynx.     51 y.o. male p/w dysuria and tachycardia and fever.     Differential diagnosis includes but is not limited to:  Patient with evidence of urosepsis upon arrival after receiving Bactrim and ceftriaxone at outside facility.  Given patient's persistent tachycardia and discomfort plan for admission and IV fluids and continued antibiotics.  I discussed this with his daughter and patient.    Ultrasound of abdomen obtained given abnormal LFTs.  See result above.  Not acute surgical pathology at this time.    Patient with 83 cc in post void residual however given ongoing urination attempts in emergency department I do not believe Renteria insertion is warranted at this time however we will continue to observe as inpatient.   I discussed potentially putting in a Renteria with Dr. Larson who agrees to give patient trial of spontaneous voiding after antibiotic  administration and if symptoms persist and urinary retention is not improved then Renteria will be placed later time during hospital stay.    The total critical care time on this patient is 40 minutes, resuscitating patient, speaking with admitting physician, and deciphering test results. This 40 minutes is exclusive of separately billable procedures.     Patient admitted to Dr. Larson in guarded condition      FINAL IMPRESSION  Visit Diagnoses     ICD-10-CM   1. Sepsis, due to unspecified organism, unspecified whether acute organ dysfunction present (HCC)  A41.9   2. Acute UTI  N39.0        Critical care time.      Electronically signed by: Tristan Perkins M.D., 3/21/2021 7:28 PM

## 2021-03-22 NOTE — ED NOTES
35338 Assessment done Blood and Bl Cx # 1 to lab  1940 Pt void 100cc orange urine in urinal ( taking pyridium ) Specimen to lab  1945 U/S to bedside Post residual void 84 ml

## 2021-03-22 NOTE — PROGRESS NOTES
Patient arrived via gurney. He is A/Ox4, VSS. LR bolus continued from ED. Assessment completed. Skin is intact. No complaints of pain. Bed in the lowest locked position, call light in reach.

## 2021-03-22 NOTE — CARE PLAN
Problem: Safety  Goal: Will remain free from injury  Outcome: PROGRESSING AS EXPECTED  Note: Bed in lowest position. Call light within reach.     Problem: Infection  Goal: Will remain free from infection  Outcome: PROGRESSING AS EXPECTED  Note: Antibiotics started.     Problem: Urinary Elimination:  Goal: Ability to reestablish a normal urinary elimination pattern will improve  Outcome: PROGRESSING AS EXPECTED  Note: Monitor I and O accurately.

## 2021-03-22 NOTE — ASSESSMENT & PLAN NOTE
With hesitancy and urgency and frequency.  Improving with ceftriaxone.  Continue ceftriaxone, follow on cultures and sensitivities

## 2021-03-22 NOTE — DISCHARGE PLANNING
Anticipated Discharge Disposition: Home     Action: Pt discussed during IDT rounds. Per Dr. Ocasio pt anticipated to d/c in 1-2 days. Anticipated to d/c with no needs.   No known d/c needs at this time.     Barriers to Discharge: None     Plan: LSW to continue to follow for d/c needs, LSW to assist as needed     Care Transition Team Assessment  The information gathered for this assessment was provided through chart review.     Information Source  Information Given By: Other (Comments)  Who is responsible for making decisions for patient? : Patient    Elopement Risk  Legal Hold: No  Ambulatory or Self Mobile in Wheelchair: Yes  Disoriented: No  Psychiatric Symptoms: None  History of Wandering: No  Elopement this Admit: No  Vocalizing Wanting to Leave: No  Displays Behaviors, Body Language Wanting to Leave: No-Not at Risk for Elopement  Elopement Risk: Not at Risk for Elopement    Interdisciplinary Discharge Planning  Patient or legal guardian wants to designate a caregiver: No    Discharge Preparedness  What is your plan after discharge?: Home with help  What are your discharge supports?: Spouse  Prior Functional Level: Independent with Activities of Daily Living, Independent with Medication Management  Difficulity with ADLs: None  Difficulity with IADLs: None    Functional Assesment  Prior Functional Level: Independent with Activities of Daily Living, Independent with Medication Management    Finances  Financial Barriers to Discharge: No  Prescription Coverage: Yes    Vision / Hearing Impairment  Vision Impairment : Yes  Right Eye Vision: Wears Glasses, Impaired  Left Eye Vision: Impaired, Wears Glasses  Hearing Impairment : No    Domestic Abuse  Have you ever been the victim of abuse or violence?: No  Physical Abuse or Sexual Abuse: No  Verbal Abuse or Emotional Abuse: No  Possible Abuse/Neglect Reported to:: Not Applicable    Psychological Assessment  History of Substance Abuse: None  History of Psychiatric  Problems: No    Discharge Risks or Barriers  Discharge risks or barriers?: No    Anticipated Discharge Information  Discharge Disposition: Discharged to home/self care (01)

## 2021-03-22 NOTE — H&P
Hospital Medicine History & Physical Note    Date of Service  3/21/2021    Primary Care Physician  Debbie Niño M.D.    Consultants  N/A    Code Status  Full Code    Chief Complaint  Chief Complaint   Patient presents with   • Abnormal Labs     Patient report of increase ANC lab results drawn at the [facility] and sent here for further evaluation.   • Painful Urination     Painful with urination and hesitancy with a fever for two days so he went to the Urgent Care for treatment   • Fever       History of Presenting Illness  51 y.o. male who presented 3/21/2021 with painful urination.  Patient reports 2 days of dysuria and urinary hesitancy with associated fevers and chills.  Patient denies any abdominal pain, shortness of breath or chest pain.  Patient went to urgent care earlier today and was sent to the ER after given a dose of ceftriaxone.  Patient denied any hematuria, or flank pain.    In the ER patient's vital significant for tachycardia and tachypnea.  Significant labs include WBC 13.2, AST 65, ALT 79, lactic acid 2.1 and UA with packed WBC and moderate bacteria.  Right upper quadrant ultrasound was done that showed 6 x 7.4 x 7.3 mm echogenic focus within the gallbladder may represent a polyp or sludge ball, follow-up ultrasound recommended, and echogenic appearance of the liver.    Review of Systems  Review of Systems   Constitutional: Positive for chills and fever.   Respiratory: Negative for shortness of breath.    Cardiovascular: Negative for chest pain.   Gastrointestinal: Negative for abdominal pain, nausea and vomiting.   Genitourinary: Positive for dysuria. Negative for flank pain and hematuria.   Musculoskeletal: Negative for myalgias.   Skin: Negative for rash.   Neurological: Negative for dizziness and headaches.   Psychiatric/Behavioral: Negative for depression.   All other systems reviewed and are negative.      Past Medical History   has a past medical history of Subcutaneous  mass.    Surgical History   has a past surgical history that includes mass excision general (Right, 9/6/2017).     Family History  Patient reports family history is unknown    Social History   reports that he has never smoked. He has never used smokeless tobacco. He reports that he does not drink alcohol and does not use drugs.    Allergies  No Known Allergies    Medications  Prior to Admission Medications   Prescriptions Last Dose Informant Patient Reported? Taking?   sulfamethoxazole-trimethoprim (BACTRIM DS) 800-160 MG tablet 3/21/2021 at Unknown time  No No   Sig: Take 1 tablet by mouth 2 times a day for 10 days.      Facility-Administered Medications: None       Physical Exam  Temp:  [37.3 °C (99.1 °F)] 37.3 °C (99.1 °F)  Pulse:  [103-106] 103  Resp:  [18-21] 21  BP: (114-134)/(59-84) 114/59  SpO2:  [96 %] 96 %    Physical Exam  Vitals and nursing note reviewed.   Constitutional:       General: He is not in acute distress.     Appearance: Normal appearance.   HENT:      Head: Normocephalic and atraumatic.   Eyes:      Conjunctiva/sclera: Conjunctivae normal.      Pupils: Pupils are equal, round, and reactive to light.   Cardiovascular:      Rate and Rhythm: Regular rhythm. Tachycardia present.      Pulses: Normal pulses.   Pulmonary:      Effort: Pulmonary effort is normal. No respiratory distress.      Breath sounds: Normal breath sounds. No wheezing.   Abdominal:      General: Abdomen is flat. There is no distension.      Palpations: Abdomen is soft.      Tenderness: There is no abdominal tenderness. There is no right CVA tenderness or left CVA tenderness.   Musculoskeletal:         General: Normal range of motion.      Cervical back: Normal range of motion and neck supple.      Right lower leg: No edema.      Left lower leg: No edema.   Skin:     General: Skin is warm and dry.      Findings: No rash.   Neurological:      General: No focal deficit present.      Mental Status: He is alert and oriented to  person, place, and time.      Cranial Nerves: No cranial nerve deficit.      Sensory: No sensory deficit.      Motor: No weakness.   Psychiatric:         Mood and Affect: Mood normal.         Behavior: Behavior normal.         Laboratory:  Recent Labs     03/21/21 1159 03/21/21 1930   WBC 11.3* 13.2*   RBC 5.37 5.11   HEMOGLOBIN 15.4 14.6   HEMATOCRIT 46.2 44.0   MCV 86.0 86.1   MCH 28.7 28.6   MCHC 33.3* 33.2*   RDW 43.8 43.0   PLATELETCT 206 178   MPV 9.3 8.7*     Recent Labs     03/21/21 1159 03/21/21 1930   SODIUM 133* 136   POTASSIUM 4.3 4.0   CHLORIDE 98 99   CO2 23 23   GLUCOSE 132* 142*   BUN 11 11   CREATININE 0.94 0.95   CALCIUM 9.5 9.3     Recent Labs     03/21/21 1159 03/21/21 1930   ALTSGPT 75* 79*   ASTSGOT 68* 65*   ALKPHOSPHAT 130* 143*   TBILIRUBIN 1.7* 1.4   LIPASE  --  18   GLUCOSE 132* 142*     Recent Labs     03/21/21 1930   INR 1.22*     No results for input(s): NTPROBNP in the last 72 hours.      No results for input(s): TROPONINT in the last 72 hours.    Imaging:  US-RUQ   Final Result      6 x 7.4 x 7.3 mm echogenic focus within the gallbladder may represent a polyp or an adherent sludge ball. Follow-up ultrasound is recommended.      Mild heterogeneous and echogenic appearance of the liver can be seen in hepatic steatosis or hepatocellular disease.      Limited evaluation of the pancreas which is obscured by overlying bowel gas      DX-CHEST-PORTABLE (1 VIEW)   Final Result         No acute cardiac or pulmonary abnormality is identified.            Assessment/Plan:  I anticipate this patient will require at least two midnights for appropriate medical management, necessitating inpatient admission.    UTI (urinary tract infection)  Assessment & Plan  Continue ceftriaxone  Patient with some urinary hesitancy and retention after voiding  Continue to monitor, likely will improve with IV antibiotics, if it does not consider urology consult    Sepsis (HCC)  Assessment & Plan  This is Sepsis  Present on admission  SIRS criteria identified on my evaluation include: Tachycardia, with heart rate greater than 90 BPM, Tachypnea, with respirations greater than 20 per minute and Leukocytosis, with WBC greater than 12,000  Source is urinary  Sepsis protocol initiated  Fluid resuscitation ordered per protocol  IV antibiotics as appropriate for source of sepsis  While organ dysfunction may be noted elsewhere in this problem list or in the chart, degree of organ dysfunction does not meet CMS criteria for severe sepsis    Continue IV ceftriaxone  Follow-up cultures  IV fluids          Gallbladder anomaly  Assessment & Plan  6 x 7.4 x 7.3 mm echogenic focus within the gallbladder may represent a polyp or sludge ball  Recommended outpatient follow-up for repeat ultrasound    Transaminitis  Assessment & Plan  Mildly elevated  Echogenic liver seen on ultrasound  Hepatitis panel pending  Recheck in a.m.    DVT prophylaxis: Lovenox

## 2021-03-22 NOTE — PROGRESS NOTES
Med rec updated and complete  Allergies reviewed  Pt reports no prescription medications, OTC's, or vitamins  Pt reports no antibiotics in the last 2 weeks   Pt has not picked up his BACTRIM 800-160MG from the pharmacy yet

## 2021-03-22 NOTE — PROGRESS NOTES
Received report from night RN. AAOX4, ambulatory, denies SOB. N/V, and flank pain at this moment. Bed in lowest position, call light within reach.

## 2021-03-22 NOTE — ASSESSMENT & PLAN NOTE
6 x 7.4 x 7.3 mm echogenic focus within the gallbladder   Finding discussed with the patient, may represent a polyp or sludge ball  Continue to monitor liver enzymes, will need follow-up imaging

## 2021-03-22 NOTE — CARE PLAN
Problem: Communication  Goal: The ability to communicate needs accurately and effectively will improve  Outcome: PROGRESSING AS EXPECTED     Problem: Infection  Goal: Will remain free from infection  Outcome: PROGRESSING AS EXPECTED     Problem: Urinary Elimination:  Goal: Ability to reestablish a normal urinary elimination pattern will improve  Outcome: PROGRESSING AS EXPECTED

## 2021-03-22 NOTE — ED NOTES
Pt dozing- per daughter pt has sleep apnea.  O2 sat dropped to 81% while sleeping.  Pt placed on 2L/min NC

## 2021-03-22 NOTE — ED TRIAGE NOTES
51 yr old male to triage with daughter and wife   Chief Complaint   Patient presents with   • Abnormal Labs     Patient report of increase ANC lab results drawn at the Urgent Care [facility] and sent here for further evaluation.   • Painful Urination     Painful with urination and hesitancy with a fever for two days so he went to the Urgent Care for treatment   • Fever

## 2021-03-22 NOTE — PROGRESS NOTES
[facility] Medicine Daily Progress Note    Date of Service  3/22/2021    Chief Complaint  51 y.o. male admitted 3/21/2021 with fever, dysuria and frequency    Hospital Course  51 years old male with no significant past medical history admitted 3/21 with fever, frequency and dysuria.  He was found to have sepsis secondary to urinary tract infection started on ceftriaxone.  He was found to have echogenic focus within his gallbladder which may represent a polyp or sludge ball, will need follow-up imaging.    Interval Problem Update  Tachycardia improving.  Blood pressures 80s over 50s this morning, denies dizziness or lightheadedness.  Clinically dehydrated continue intravenous fluids.  On 2 L of oxygen, encourage incentive spirometer, try to wean off as tolerable.  Dysuria, urgency and frequency present. Continue ceftriaxone, follow on cultures and sensitivities   Anticipate medical clearance in 1-2 days, likely home without needs  Discussed with patient, patient's nurse and with multidisciplinary team during rounds including , pharmacist and charge nurse.      Consultants/Specialty  None     Code Status  Full Code    Disposition  Anticipate medical clearance in 1-2 days, likely home without needs    Review of Systems  Review of Systems   Constitutional: Positive for chills, fever and malaise/fatigue.   Eyes: Negative for redness.   Respiratory: Negative for shortness of breath and stridor.    Cardiovascular: Negative for chest pain.   Gastrointestinal: Negative for abdominal pain and vomiting.   Genitourinary: Positive for dysuria, frequency and urgency. Negative for flank pain and hematuria.   Musculoskeletal: Negative for myalgias.   Skin: Negative for rash.   Neurological: Negative for headaches.   Psychiatric/Behavioral: Negative for depression.      Physical Exam  Temp:  [36.3 °C (97.4 °F)-37.3 °C (99.1 °F)] 36.3 °C (97.4 °F)  Pulse:  [] 85  Resp:  [18-54] 20  BP: ()/(54-84) 104/57  SpO2:   [81 %-98 %] 98 %    Physical Exam  Vitals and nursing note reviewed.   Constitutional:       General: He is not in acute distress.  HENT:      Head: Normocephalic and atraumatic.      Right Ear: External ear normal.      Left Ear: External ear normal.      Mouth/Throat:      Mouth: Mucous membranes are dry.   Eyes:      Conjunctiva/sclera: Conjunctivae normal.      Pupils: Pupils are equal, round, and reactive to light.   Cardiovascular:      Rate and Rhythm: Regular rhythm. Tachycardia present.      Pulses: Normal pulses.   Pulmonary:      Effort: Pulmonary effort is normal.   Abdominal:      General: Abdomen is flat. There is no distension.      Palpations: Abdomen is soft.      Tenderness: There is no abdominal tenderness. There is no right CVA tenderness or left CVA tenderness.   Musculoskeletal:         General: Normal range of motion.      Cervical back: Normal range of motion and neck supple.      Right lower leg: No edema.      Left lower leg: No edema.   Skin:     General: Skin is warm and dry.      Capillary Refill: Capillary refill takes 2 to 3 seconds.      Findings: No rash.   Neurological:      General: No focal deficit present.      Mental Status: He is alert and oriented to person, place, and time.   Psychiatric:         Mood and Affect: Mood normal.         Behavior: Behavior normal.       Fluids    Intake/Output Summary (Last 24 hours) at 3/22/2021 1208  Last data filed at 3/22/2021 0800  Gross per 24 hour   Intake 460 ml   Output 200 ml   Net 260 ml     Laboratory  Recent Labs     03/21/21  1159 03/21/21 1930 03/22/21  0403   WBC 11.3* 13.2* 9.2   RBC 5.37 5.11 4.63*   HEMOGLOBIN 15.4 14.6 13.1*   HEMATOCRIT 46.2 44.0 40.2*   MCV 86.0 86.1 86.8   MCH 28.7 28.6 28.3   MCHC 33.3* 33.2* 32.6*   RDW 43.8 43.0 43.9   PLATELETCT 206 178 141*   MPV 9.3 8.7* 9.2     Recent Labs     03/21/21  1159 03/21/21 1930 03/22/21  0403   SODIUM 133* 136 135   POTASSIUM 4.3 4.0 3.8   CHLORIDE 98 99 103   CO2 23 23  24   GLUCOSE 132* 142* 139*   BUN 11 11 11   CREATININE 0.94 0.95 0.94   CALCIUM 9.5 9.3 8.3*     Recent Labs     03/21/21  1930   INR 1.22*               Imaging  US-RUQ   Final Result      6 x 7.4 x 7.3 mm echogenic focus within the gallbladder may represent a polyp or an adherent sludge ball. Follow-up ultrasound is recommended.      Mild heterogeneous and echogenic appearance of the liver can be seen in hepatic steatosis or hepatocellular disease.      Limited evaluation of the pancreas which is obscured by overlying bowel gas      DX-CHEST-PORTABLE (1 VIEW)   Final Result         No acute cardiac or pulmonary abnormality is identified.         Assessment/Plan  UTI (urinary tract infection)  Assessment & Plan  With hesitancy and urgency and frequency.  Improving with ceftriaxone.  Continue ceftriaxone, follow on cultures and sensitivities     Sepsis (HCC)  Assessment & Plan  This is Sepsis Present on admission  Source is urinary     Gallbladder anomaly  Assessment & Plan  6 x 7.4 x 7.3 mm echogenic focus within the gallbladder   Finding discussed with the patient, may represent a polyp or sludge ball  Continue to monitor liver enzymes, will need follow-up imaging     Transaminitis  Assessment & Plan  Mildly elevated, could be related to sepsis   Echogenic liver seen on ultrasound  Continue to monitor, avoid hepatotoxins, consider follow-up imaging according to clinical course      VTE prophylaxis: Lovenox

## 2021-03-23 VITALS
WEIGHT: 157.41 LBS | HEART RATE: 70 BPM | SYSTOLIC BLOOD PRESSURE: 112 MMHG | HEIGHT: 63 IN | RESPIRATION RATE: 18 BRPM | OXYGEN SATURATION: 98 % | TEMPERATURE: 98.2 F | BODY MASS INDEX: 27.89 KG/M2 | DIASTOLIC BLOOD PRESSURE: 69 MMHG

## 2021-03-23 LAB
ALBUMIN SERPL BCP-MCNC: 3.2 G/DL (ref 3.2–4.9)
ALBUMIN/GLOB SERPL: 1.1 G/DL
ALP SERPL-CCNC: 144 U/L (ref 30–99)
ALT SERPL-CCNC: 47 U/L (ref 2–50)
ANION GAP SERPL CALC-SCNC: 10 MMOL/L (ref 7–16)
AST SERPL-CCNC: 32 U/L (ref 12–45)
BACTERIA UR CULT: ABNORMAL
BACTERIA UR CULT: ABNORMAL
BILIRUB SERPL-MCNC: 0.7 MG/DL (ref 0.1–1.5)
BUN SERPL-MCNC: 7 MG/DL (ref 8–22)
CALCIUM SERPL-MCNC: 8.3 MG/DL (ref 8.4–10.2)
CHLORIDE SERPL-SCNC: 105 MMOL/L (ref 96–112)
CO2 SERPL-SCNC: 21 MMOL/L (ref 20–33)
CREAT SERPL-MCNC: 0.73 MG/DL (ref 0.5–1.4)
GLOBULIN SER CALC-MCNC: 2.9 G/DL (ref 1.9–3.5)
GLUCOSE SERPL-MCNC: 135 MG/DL (ref 65–99)
POTASSIUM SERPL-SCNC: 4 MMOL/L (ref 3.6–5.5)
PROT SERPL-MCNC: 6.1 G/DL (ref 6–8.2)
SIGNIFICANT IND 70042: ABNORMAL
SITE SITE: ABNORMAL
SODIUM SERPL-SCNC: 136 MMOL/L (ref 135–145)
SOURCE SOURCE: ABNORMAL

## 2021-03-23 PROCEDURE — 700102 HCHG RX REV CODE 250 W/ 637 OVERRIDE(OP): Performed by: INTERNAL MEDICINE

## 2021-03-23 PROCEDURE — 700105 HCHG RX REV CODE 258: Performed by: INTERNAL MEDICINE

## 2021-03-23 PROCEDURE — 80053 COMPREHEN METABOLIC PANEL: CPT

## 2021-03-23 PROCEDURE — A9270 NON-COVERED ITEM OR SERVICE: HCPCS | Performed by: INTERNAL MEDICINE

## 2021-03-23 PROCEDURE — 99239 HOSP IP/OBS DSCHRG MGMT >30: CPT | Performed by: INTERNAL MEDICINE

## 2021-03-23 PROCEDURE — 700111 HCHG RX REV CODE 636 W/ 250 OVERRIDE (IP): Performed by: INTERNAL MEDICINE

## 2021-03-23 PROCEDURE — 36415 COLL VENOUS BLD VENIPUNCTURE: CPT

## 2021-03-23 RX ORDER — CEFDINIR 300 MG/1
300 CAPSULE ORAL 2 TIMES DAILY
Qty: 14 CAPSULE | Refills: 0 | Status: SHIPPED | OUTPATIENT
Start: 2021-03-23 | End: 2021-03-23 | Stop reason: SDUPTHER

## 2021-03-23 RX ORDER — CEFDINIR 300 MG/1
300 CAPSULE ORAL 2 TIMES DAILY
Qty: 14 CAPSULE | Refills: 0 | Status: SHIPPED | OUTPATIENT
Start: 2021-03-23 | End: 2021-03-30

## 2021-03-23 RX ADMIN — CEFTRIAXONE SODIUM 1 G: 1 INJECTION, POWDER, FOR SOLUTION INTRAMUSCULAR; INTRAVENOUS at 05:22

## 2021-03-23 RX ADMIN — ACETAMINOPHEN 650 MG: 325 TABLET, FILM COATED ORAL at 05:40

## 2021-03-23 NOTE — PROGRESS NOTES
Assumed care of the patient at 1900 from day RN. The patient is resting in bed with no signs of distress. He is A/Ox4, VSS. He denies any needs or pain. Bed in the lowest locked position, call light in reach. Encouraged patient to call with any needs.

## 2021-03-23 NOTE — DISCHARGE INSTRUCTIONS
Discharge Instructions    Discharged to home by car with relative. Discharged via wheelchair, hospital escort: Yes.  Special equipment needed: Not Applicable    Be sure to schedule a follow-up appointment with your primary care doctor or any specialists as instructed.     Discharge Plan:   Diet Plan: Discussed  Activity Level: Discussed  Confirmed Follow up Appointment: Patient to Call and Schedule Appointment  Confirmed Symptoms Management: Discussed  Medication Reconciliation Updated: Yes  Influenza Vaccine Indication: Patient Refuses    I understand that a diet low in cholesterol, fat, and sodium is recommended for good health. Unless I have been given specific instructions below for another diet, I accept this instruction as my diet prescription.     Special Instructions: None    · Is patient discharged on Warfarin / Coumadin?   No       Sepsis, Diagnosis, Adult  Sepsis is a serious bodily reaction to an infection. The infection that triggers sepsis may be from a bacteria, virus, or fungus. Sepsis can result from an infection in any part of your body. Infections that commonly lead to sepsis include skin, lung, and urinary tract infections.  Sepsis is a medical emergency that must be treated right away in a hospital. In severe cases, it can lead to septic shock. Septic shock can weaken your heart and cause your blood pressure to drop. This can cause your central nervous system and your body's organs to stop working.  What are the causes?  This condition is caused by a severe reaction to infections from bacteria, viruses, or fungus. The germs that most often lead to sepsis include:  · Escherichia coli (E. coli) bacteria.  · Staphylococcus aureus (staph) bacteria.  · Some types of Streptococcus bacteria.  The most common infections affect these organs:  · The lung (pneumonia).  · The kidneys or bladder (urinary tract infection).  · The skin (cellulitis).  · The bowel, gallbladder, or pancreas.  What increases the  risk?  You are more likely to develop this condition if:  · Your body's disease-fighting system (immune system) is weakened.  · You are age 65 or older.  · You are male.  · You had surgery or you have been hospitalized.  · You have these devices inserted into your body:  ? A small, thin tube (catheter).  ? IV line.  ? Breathing tube.  ? Drainage tube.  · You are not getting enough nutrients from food (malnourished).  · You have a long-term (chronic) disease, such as cancer, lung disease, kidney disease, or diabetes.  · You are .  What are the signs or symptoms?  Symptoms of this condition may include:  · Fever.  · Chills or feeling very cold.  · Confusion or anxiety.  · Fatigue.  · Muscle aches.  · Shortness of breath.  · Nausea and vomiting.  · Urinating much less than usual.  · Fast heart rate (tachycardia).  · Rapid breathing (hyperventilation).  · Changes in skin color. Your skin may look blotchy, pale, or blue.  · Cool, clammy, or sweaty skin.  · Skin rash.  Other symptoms depend on the source of your infection.  How is this diagnosed?  This condition is diagnosed based on:  · Your symptoms.  · Your medical history.  · A physical exam.  Other tests may also be done to find out the cause of the infection and how severe the sepsis is. These tests may include:  · Blood tests.  · Urine tests.  · Swabs from other areas of your body that may have an infection. These samples may be tested (cultured) to find out what type of bacteria is causing the infection.  · Chest X-ray to check for pneumonia. Other imaging tests, such as a CT scan, may also be done.  · Lumbar puncture. This removes a small amount of the fluid that surrounds your brain and spinal cord. The fluid is then examined for infection.  How is this treated?  This condition must be treated in a hospital. Based on the cause of your infection, you may be given an antibiotic, antiviral, or antifungal medicine.  You may also receive:  · Fluids  through an IV.  · Oxygen and breathing assistance.  · Medicines to increase your blood pressure.  · Kidney dialysis. This process cleans your blood if your kidneys have failed.  · Surgery to remove infected tissue.  · Blood transfusion if needed.  · Medicine to prevent blood clots.  · Nutrients to correct imbalances in basic body function (metabolism). You may:  ? Receive important salts and minerals (electrolytes) through an IV.  ? Have your blood sugar level adjusted.  Follow these instructions at home:  Medicines    · Take over-the-counter and prescription medicines only as told by your health care provider.  · If you were prescribed an antibiotic, antiviral, or antifungal medicine, take it as told by your health care provider. Do not stop taking the medicine even if you start to feel better.  General instructions  · If you have a catheter or other indwelling device, ask to have it removed as soon as possible.  · Keep all follow-up visits as told by your health care provider. This is important.  Contact a health care provider if:  · You do not feel like you are getting better or regaining strength.  · You are having trouble coping with your recovery.  · You frequently feel tired.  · You feel worse or do not seem to get better after surgery.  · You think you may have an infection after surgery.  Get help right away if:  · You have any symptoms of sepsis.  · You have difficulty breathing.  · You have a rapid or skipping heartbeat.  · You become confused or disoriented.  · You have a high fever.  · Your skin becomes blotchy, pale, or blue.  · You have an infection that is getting worse or not getting better.  These symptoms may represent a serious problem that is an emergency. Do not wait to see if the symptoms will go away. Get medical help right away. Call your local emergency services (911 in the [place] Do not drive yourself to the hospital.  Summary  · Sepsis is a medical emergency that requires immediate  treatment in a hospital.  · This condition is caused by a severe reaction to infections from bacteria, viruses, or fungus.  · Based on the cause of your infection, you may be given an antibiotic, antiviral, or antifungal medicine.  · Treatment may also include IV fluids, breathing assistance, and kidney dialysis.  This information is not intended to replace advice given to you by your health care provider. Make sure you discuss any questions you have with your health care provider.  Document Released: 09/15/2004 Document Revised: 07/26/2019 Document Reviewed: 07/26/2019  [employer] Patient Education © 2020 [employer]        Urinary Tract Infection, Adult    A urinary tract infection (UTI) is an infection of any part of the urinary tract. The urinary tract includes the kidneys, ureters, bladder, and urethra. These organs make, store, and get rid of urine in the body.  Your health care provider may use other names to describe the infection. An upper UTI affects the ureters and kidneys (pyelonephritis). A lower UTI affects the bladder (cystitis) and urethra (urethritis).  What are the causes?  Most urinary tract infections are caused by bacteria in your genital area, around the entrance to your urinary tract (urethra). These bacteria grow and cause inflammation of your urinary tract.  What increases the risk?  You are more likely to develop this condition if:  · You have a urinary catheter that stays in place (indwelling).  · You are not able to control when you urinate or have a bowel movement (you have incontinence).  · You are female and you:  ? Use a spermicide or diaphragm for birth control.  ? Have low estrogen levels.  ? Are pregnant.  · You have certain genes that increase your risk (genetics).  · You are sexually active.  · You take antibiotic medicines.  · You have a condition that causes your flow of urine to slow down, such as:  ? An enlarged prostate, if you are male.  ? Blockage in your urethra  (stricture).  ? A kidney stone.  ? A nerve condition that affects your bladder control (neurogenic bladder).  ? Not getting enough to drink, or not urinating often.  · You have certain medical conditions, such as:  ? Diabetes.  ? A weak disease-fighting system (immunesystem).  ? Sickle cell disease.  ? Gout.  ? Spinal cord injury.  What are the signs or symptoms?  Symptoms of this condition include:  · Needing to urinate right away (urgently).  · Frequent urination or passing small amounts of urine frequently.  · Pain or burning with urination.  · Blood in the urine.  · Urine that smells bad or unusual.  · Trouble urinating.  · Cloudy urine.  · Vaginal discharge, if you are female.  · Pain in the abdomen or the lower back.  You may also have:  · Vomiting or a decreased appetite.  · Confusion.  · Irritability or tiredness.  · A fever.  · Diarrhea.  The first symptom in older adults may be confusion. In some cases, they may not have any symptoms until the infection has worsened.  How is this diagnosed?  This condition is diagnosed based on your medical history and a physical exam. You may also have other tests, including:  · Urine tests.  · Blood tests.  · Tests for sexually transmitted infections (STIs).  If you have had more than one UTI, a cystoscopy or imaging studies may be done to determine the cause of the infections.  How is this treated?  Treatment for this condition includes:  · Antibiotic medicine.  · Over-the-counter medicines to treat discomfort.  · Drinking enough water to stay hydrated.  If you have frequent infections or have other conditions such as a kidney stone, you may need to see a health care provider who specializes in the urinary tract (urologist).  In rare cases, urinary tract infections can cause sepsis. Sepsis is a life-threatening condition that occurs when the body responds to an infection. Sepsis is treated in the hospital with IV antibiotics, fluids, and other medicines.  Follow these  instructions at home:    Medicines  · Take over-the-counter and prescription medicines only as told by your health care provider.  · If you were prescribed an antibiotic medicine, take it as told by your health care provider. Do not stop using the antibiotic even if you start to feel better.  General instructions  · Make sure you:  ? Empty your bladder often and completely. Do not hold urine for long periods of time.  ? Empty your bladder after sex.  ? Wipe from front to back after a bowel movement if you are female. Use each tissue one time when you wipe.  · Drink enough fluid to keep your urine pale yellow.  · Keep all follow-up visits as told by your health care provider. This is important.  Contact a health care provider if:  · Your symptoms do not get better after 1-2 days.  · Your symptoms go away and then return.  Get help right away if you have:  · Severe pain in your back or your lower abdomen.  · A fever.  · Nausea or vomiting.  Summary  · A urinary tract infection (UTI) is an infection of any part of the urinary tract, which includes the kidneys, ureters, bladder, and urethra.  · Most urinary tract infections are caused by bacteria in your genital area, around the entrance to your urinary tract (urethra).  · Treatment for this condition often includes antibiotic medicines.  · If you were prescribed an antibiotic medicine, take it as told by your health care provider. Do not stop using the antibiotic even if you start to feel better.  · Keep all follow-up visits as told by your health care provider. This is important.  This information is not intended to replace advice given to you by your health care provider. Make sure you discuss any questions you have with your health care provider.  Document Released: 09/27/2006 Document Revised: 12/05/2019 Document Reviewed: 06/27/2019  [employer] Patient Education © 2020 [employer]    Depression / Suicide Risk    As you are discharged from [facility]  facility, it is important to learn how to keep safe from harming yourself.    Recognize the warning signs:  · Abrupt changes in personality, positive or negative- including increase in energy   · Giving away possessions  · Change in eating patterns- significant weight changes-  positive or negative  · Change in sleeping patterns- unable to sleep or sleeping all the time   · Unwillingness or inability to communicate  · Depression  · Unusual sadness, discouragement and loneliness  · Talk of wanting to die  · Neglect of personal appearance   · Rebelliousness- reckless behavior  · Withdrawal from people/activities they love  · Confusion- inability to concentrate     If you or a loved one observes any of these behaviors or has concerns about self-harm, here's what you can do:  · Talk about it- your feelings and reasons for harming yourself  · Remove any means that you might use to hurt yourself (examples: pills, rope, extension cords, firearm)  · Get professional help from the community (Mental Health, Substance Abuse, psychological counseling)  · Do not be alone:Call your Safe Contact- someone whom you trust who will be there for you.  · Call your local CRISIS HOTLINE 506-1163 or 029-028-7526  · Call your local Children's Mobile Crisis Response Team [place] (027) 740-0430 or www.[employer]  · Call the toll free National Suicide Prevention Hotlines   · National Suicide Prevention Lifeline 047-023-OFRE (4654)  · National Hope Line Network 800-SUICIDE (586-7169)

## 2021-03-23 NOTE — CARE PLAN
Problem: Bowel/Gastric:  Goal: Normal bowel function is maintained or improved  Outcome: PROGRESSING AS EXPECTED     Problem: Discharge Barriers/Planning  Goal: Patient's continuum of care needs will be met  Outcome: PROGRESSING AS EXPECTED     Problem: Infection  Goal: Will remain free from infection  Outcome: PROGRESSING SLOWER THAN EXPECTED     Problem: Urinary Elimination:  Goal: Ability to reestablish a normal urinary elimination pattern will improve  Outcome: PROGRESSING AS EXPECTED

## 2021-03-23 NOTE — DISCHARGE SUMMARY
Discharge Summary    CHIEF COMPLAINT ON ADMISSION  Chief Complaint   Patient presents with   • Abnormal Labs     Patient report of increase ANC lab results drawn at the Urgent Care [facility] and sent here for further evaluation.   • Painful Urination     Painful with urination and hesitancy with a fever for two days so he went to the Urgent Care for treatment   • Fever       Reason for Admission  Abnormal Labs, Fever     Admission Date  3/21/2021    CODE STATUS  Full Code    HPI & HOSPITAL COURSE  This is a 51 y.o. male with a history of hyperlipidemia admitted for pain with urination and fever, found to have acute complicated UTI in the setting of a male patient.  He was placed on empiric ceftriaxone therapy.  Urine cultures grew pansensitive E. coli.  With antibiotic treatment, his dysuria and fever resolved.  His white count which was initially elevated, returned to the normal range.  Blood pressure was running on the low end of normal however this appeared to be his baseline.    He does not have a history of recurrent urinary tract infections.  He was instructed to follow-up with his primary care physician, Dr. Niño, within 1 week to ensure his symptoms did not return.    Of note, his admission labs revealed mild elevation in LFTs.  RUQ US revealed an echogenic liver and possibly a polyp in the gallbladder.  He was told to follow this up as an outpatient however was not complaining of any RUQ discomfort, nausea or vomiting.  Serial liver function testing is recommended as an outpatient.    Therefore, he is discharged in good and stable condition to home with close outpatient follow-up.    The patient met 2-midnight criteria for an inpatient stay at the time of discharge.    Discharge Date  3/23/2021    FOLLOW UP ITEMS POST DISCHARGE  Follow-up with PCP within 1 week  Repeat LFT testing at appropriate intervals to ensure these remain stable    DISCHARGE DIAGNOSES  Active Problems:    Sepsis (HCC) POA:  Unknown    UTI (urinary tract infection) POA: Unknown    Transaminitis POA: Unknown    Gallbladder anomaly POA: Unknown  Resolved Problems:    * No resolved hospital problems. *      FOLLOW UP  No future appointments.  No follow-up provider specified.    MEDICATIONS ON DISCHARGE     Medication List      START taking these medications      Instructions   cefdinir 300 MG Caps  Commonly known as: OMNICEF   Take 1 capsule by mouth 2 times a day for 7 days.  Dose: 300 mg        STOP taking these medications    sulfamethoxazole-trimethoprim 800-160 MG tablet  Commonly known as: Bactrim DS            Allergies  No Known Allergies    DIET  Orders Placed This Encounter   Procedures   • Diet Order Diet: Regular     Standing Status:   Standing     Number of Occurrences:   1     Order Specific Question:   Diet:     Answer:   Regular [1]       ACTIVITY  As tolerated.  Weight bearing as tolerated    CONSULTATIONS  None    PROCEDURES  None    LABORATORY  Lab Results   Component Value Date    SODIUM 136 03/23/2021    POTASSIUM 4.0 03/23/2021    CHLORIDE 105 03/23/2021    CO2 21 03/23/2021    GLUCOSE 135 (H) 03/23/2021    BUN 7 (L) 03/23/2021    CREATININE 0.73 03/23/2021        Lab Results   Component Value Date    WBC 9.2 03/22/2021    HEMOGLOBIN 13.1 (L) 03/22/2021    HEMATOCRIT 40.2 (L) 03/22/2021    PLATELETCT 141 (L) 03/22/2021        Total time of the discharge process exceeds 38 minutes.

## 2021-03-24 LAB
BACTERIA UR CULT: NORMAL
SIGNIFICANT IND 70042: NORMAL
SITE SITE: NORMAL
SOURCE SOURCE: NORMAL

## 2021-03-26 LAB
BACTERIA BLD CULT: NORMAL
BACTERIA BLD CULT: NORMAL
SIGNIFICANT IND 70042: NORMAL
SIGNIFICANT IND 70042: NORMAL
SITE SITE: NORMAL
SITE SITE: NORMAL
SOURCE SOURCE: NORMAL
SOURCE SOURCE: NORMAL

## 2022-03-11 NOTE — ASSESSMENT & PLAN NOTE
Mildly elevated, could be related to sepsis   Echogenic liver seen on ultrasound  Continue to monitor, avoid hepatotoxins, consider follow-up imaging according to clinical course    Use medication as directed, and you may also alternate Tylenol and Motrin every 3 hours as directed for pain/fever.  See your pediatrician in 1-2 days, and return to the emergency room for worsening condition.

## (undated) DEVICE — CANISTER SUCTION 3000ML MECHANICAL FILTER AUTO SHUTOFF MEDI-VAC NONSTERILE LF DISP  (40EA/CA)

## (undated) DEVICE — HEAD HOLDER JUNIOR/ADULT

## (undated) DEVICE — SUTURE 4-0 VICRYL PLUS FS-2 - 27 INCH (36/BX)

## (undated) DEVICE — ELECTRODE DUAL RETURN W/ CORD - (50/PK)

## (undated) DEVICE — TUBE E-T HI-LO CUFF 7.5MM (10EA/PK)

## (undated) DEVICE — SET EXTENSION WITH 2 PORTS (48EA/CA) ***PART #2C8610 IS A SUBSTITUTE*****

## (undated) DEVICE — PROTECTOR ULNA NERVE - (36PR/CA)

## (undated) DEVICE — WATER IRRIGATION STERILE 1000ML (12EA/CA)

## (undated) DEVICE — CANISTER SUCTION RIGID RED 1500CC (40EA/CA)

## (undated) DEVICE — NEPTUNE 4 PORT MANIFOLD - (20/PK)

## (undated) DEVICE — SET LEADWIRE 5 LEAD BEDSIDE DISPOSABLE ECG (1SET OF 5/EA)

## (undated) DEVICE — MASK ANESTHESIA ADULT  - (100/CA)

## (undated) DEVICE — SUTURE 3-0 VICRYL PLUS SH - 8X 18 INCH (12/BX)

## (undated) DEVICE — KIT  I.V. START (100EA/CA)

## (undated) DEVICE — GLOVE BIOGEL SZ 6.5 SURGICAL PF LTX (50PR/BX 4BX/CA)

## (undated) DEVICE — GOWN SURGEONS LARGE - (32/CA)

## (undated) DEVICE — TUBE CONNECTING SUCTION - CLEAR PLASTIC STERILE 72 IN (50EA/CA)

## (undated) DEVICE — DRAPE LAPAROTOMY T SHEET - (12EA/CA)

## (undated) DEVICE — KIT ANESTHESIA W/CIRCUIT & 3/LT BAG W/FILTER (20EA/CA)

## (undated) DEVICE — GLOVE SZ 6 BIOGEL PI MICRO - PF LF (50PR/BX 4BX/CA)

## (undated) DEVICE — ELECTRODE 850 FOAM ADHESIVE - HYDROGEL RADIOTRNSPRNT (50/PK)

## (undated) DEVICE — SUTURE GENERAL

## (undated) DEVICE — GLOVE BIOGEL PI INDICATOR SZ 6.5 SURGICAL PF LF - (50/BX 4BX/CA)

## (undated) DEVICE — SENSOR SPO2 NEO LNCS ADHESIVE (20/BX) SEE USER NOTES

## (undated) DEVICE — SPONGE GAUZESTER. 2X2 4-PL - (2/PK 50PK/BX 30BX/CS)

## (undated) DEVICE — TUBING CLEARLINK DUO-VENT - C-FLO (48EA/CA)

## (undated) DEVICE — GLOVE BIOGEL PI INDICATOR SZ 7.0 SURGICAL PF LF - (50/BX 4BX/CA)

## (undated) DEVICE — SODIUM CHL IRRIGATION 0.9% 1000ML (12EA/CA)

## (undated) DEVICE — LACTATED RINGERS INJ 1000 ML - (14EA/CA 60CA/PF)

## (undated) DEVICE — PACK MINOR BASIN - (2EA/CA)

## (undated) DEVICE — CATHETER IV 20 GA X 1-1/4 ---SURG.& SDS ONLY--- (50EA/BX)

## (undated) DEVICE — SUCTION INSTRUMENT YANKAUER BULBOUS TIP W/O VENT (50EA/CA)

## (undated) DEVICE — GLOVE BIOGEL SZ 7 SURGICAL PF LTX - (50PR/BX 4BX/CA)

## (undated) DEVICE — CHLORAPREP 26 ML APPLICATOR - ORANGE TINT(25/CA)